# Patient Record
Sex: FEMALE | Race: WHITE | Employment: OTHER | URBAN - NONMETROPOLITAN AREA
[De-identification: names, ages, dates, MRNs, and addresses within clinical notes are randomized per-mention and may not be internally consistent; named-entity substitution may affect disease eponyms.]

---

## 2019-10-16 ENCOUNTER — OFFICE VISIT (OUTPATIENT)
Dept: NEPHROLOGY | Age: 64
End: 2019-10-16
Payer: MEDICAID

## 2019-10-16 ENCOUNTER — NURSE ONLY (OUTPATIENT)
Dept: LAB | Age: 64
End: 2019-10-16

## 2019-10-16 VITALS
DIASTOLIC BLOOD PRESSURE: 89 MMHG | OXYGEN SATURATION: 100 % | WEIGHT: 147.8 LBS | HEART RATE: 75 BPM | HEIGHT: 67 IN | SYSTOLIC BLOOD PRESSURE: 137 MMHG | BODY MASS INDEX: 23.2 KG/M2

## 2019-10-16 DIAGNOSIS — I10 ESSENTIAL HYPERTENSION: ICD-10-CM

## 2019-10-16 DIAGNOSIS — N02.8 IGA NEPHROPATHY, ACUTE: Primary | ICD-10-CM

## 2019-10-16 DIAGNOSIS — N18.4 CKD (CHRONIC KIDNEY DISEASE) STAGE 4, GFR 15-29 ML/MIN (HCC): ICD-10-CM

## 2019-10-16 DIAGNOSIS — N02.8 IGA NEPHROPATHY, ACUTE: ICD-10-CM

## 2019-10-16 DIAGNOSIS — N03.9 CHRONIC GLOMERULONEPHRITIS: ICD-10-CM

## 2019-10-16 LAB
ALBUMIN SERPL-MCNC: 4.1 G/DL (ref 3.5–5.1)
ALP BLD-CCNC: 67 U/L (ref 38–126)
ALT SERPL-CCNC: 7 U/L (ref 11–66)
AMORPHOUS: ABNORMAL
ANION GAP SERPL CALCULATED.3IONS-SCNC: 15 MEQ/L (ref 8–16)
AST SERPL-CCNC: 10 U/L (ref 5–40)
BACTERIA: ABNORMAL
BILIRUB SERPL-MCNC: < 0.2 MG/DL (ref 0.3–1.2)
BILIRUBIN DIRECT: < 0.2 MG/DL (ref 0–0.3)
BILIRUBIN URINE: NEGATIVE
BLOOD, URINE: ABNORMAL
BUN BLDV-MCNC: 32 MG/DL (ref 7–22)
CALCIUM SERPL-MCNC: 9.4 MG/DL (ref 8.5–10.5)
CASTS: ABNORMAL /LPF
CHARACTER, URINE: ABNORMAL
CHLORIDE BLD-SCNC: 109 MEQ/L (ref 98–111)
CO2: 20 MEQ/L (ref 23–33)
COLOR: YELLOW
CREAT SERPL-MCNC: 2.3 MG/DL (ref 0.4–1.2)
CREATININE URINE: 46.4 MG/DL
CRYSTALS: ABNORMAL
EPITHELIAL CELLS, UA: ABNORMAL /HPF
ERYTHROCYTE [DISTWIDTH] IN BLOOD BY AUTOMATED COUNT: 12.6 % (ref 11.5–14.5)
ERYTHROCYTE [DISTWIDTH] IN BLOOD BY AUTOMATED COUNT: 50.2 FL (ref 35–45)
FERRITIN: 343 NG/ML (ref 10–291)
GFR SERPL CREATININE-BSD FRML MDRD: 21 ML/MIN/1.73M2
GLUCOSE BLD-MCNC: 97 MG/DL (ref 70–108)
GLUCOSE, URINE: NEGATIVE MG/DL
HCT VFR BLD CALC: 35.9 % (ref 37–47)
HEMOGLOBIN: 11.8 GM/DL (ref 12–16)
IRON SATURATION: 30 % (ref 20–50)
IRON: 73 UG/DL (ref 50–170)
KETONES, URINE: NEGATIVE
LEUKOCYTE ESTERASE, URINE: ABNORMAL
MCH RBC QN AUTO: 35.8 PG (ref 26–33)
MCHC RBC AUTO-ENTMCNC: 32.9 GM/DL (ref 32.2–35.5)
MCV RBC AUTO: 108.8 FL (ref 81–99)
NITRITE, URINE: POSITIVE
PH UA: 8.5 (ref 5–9)
PLATELET # BLD: 184 THOU/MM3 (ref 130–400)
PMV BLD AUTO: 9 FL (ref 9.4–12.4)
POTASSIUM SERPL-SCNC: 4.3 MEQ/L (ref 3.5–5.2)
PROT/CREAT RATIO, UR: 3.49
PROTEIN UA: 100 MG/DL
PROTEIN, URINE: 161.8 MG/DL
RBC # BLD: 3.3 MILL/MM3 (ref 4.2–5.4)
RBC URINE: ABNORMAL /HPF
SODIUM BLD-SCNC: 144 MEQ/L (ref 135–145)
SPECIFIC GRAVITY UA: 1.01 (ref 1–1.03)
TOTAL IRON BINDING CAPACITY: 240 UG/DL (ref 171–450)
TOTAL PROTEIN: 6.7 G/DL (ref 6.1–8)
UROBILINOGEN, URINE: 0.2 EU/DL (ref 0–1)
WBC # BLD: 5.9 THOU/MM3 (ref 4.8–10.8)
WBC UA: ABNORMAL /HPF

## 2019-10-16 PROCEDURE — G8427 DOCREV CUR MEDS BY ELIG CLIN: HCPCS | Performed by: INTERNAL MEDICINE

## 2019-10-16 PROCEDURE — G8484 FLU IMMUNIZE NO ADMIN: HCPCS | Performed by: INTERNAL MEDICINE

## 2019-10-16 PROCEDURE — 4004F PT TOBACCO SCREEN RCVD TLK: CPT | Performed by: INTERNAL MEDICINE

## 2019-10-16 PROCEDURE — 3017F COLORECTAL CA SCREEN DOC REV: CPT | Performed by: INTERNAL MEDICINE

## 2019-10-16 PROCEDURE — 99203 OFFICE O/P NEW LOW 30 MIN: CPT | Performed by: INTERNAL MEDICINE

## 2019-10-16 PROCEDURE — G8420 CALC BMI NORM PARAMETERS: HCPCS | Performed by: INTERNAL MEDICINE

## 2019-10-16 RX ORDER — AMLODIPINE BESYLATE 2.5 MG/1
2.5 TABLET ORAL DAILY
Refills: 3 | COMMUNITY
Start: 2019-10-07 | End: 2019-10-30 | Stop reason: ALTCHOICE

## 2019-10-16 RX ORDER — PREDNISONE 1 MG/1
5 TABLET ORAL DAILY
COMMUNITY
Start: 2019-08-06 | End: 2019-10-30 | Stop reason: SDUPTHER

## 2019-10-16 RX ORDER — B-COMPLEX WITH VITAMIN C
1 TABLET ORAL DAILY
Qty: 30 TABLET | Refills: 3 | Status: SHIPPED | OUTPATIENT
Start: 2019-10-16 | End: 2020-02-25

## 2019-10-16 RX ORDER — PANTOPRAZOLE SODIUM 40 MG/1
40 TABLET, DELAYED RELEASE ORAL DAILY
COMMUNITY
End: 2020-02-25

## 2019-10-16 RX ORDER — FERROUS SULFATE 325(65) MG
325 TABLET ORAL 2 TIMES DAILY
COMMUNITY
End: 2021-09-14

## 2019-10-16 RX ORDER — CARVEDILOL 12.5 MG/1
12.5 TABLET ORAL DAILY
Refills: 6 | COMMUNITY
Start: 2019-10-07 | End: 2021-09-14

## 2019-10-17 ENCOUNTER — TELEPHONE (OUTPATIENT)
Dept: NEPHROLOGY | Age: 64
End: 2019-10-17

## 2019-10-17 PROBLEM — N03.9 CHRONIC GLOMERULONEPHRITIS: Status: ACTIVE | Noted: 2019-10-17

## 2019-10-17 PROBLEM — N02.B9 IGA NEPHROPATHY, ACUTE: Status: ACTIVE | Noted: 2019-10-17

## 2019-10-17 PROBLEM — N02.8 IGA NEPHROPATHY, ACUTE: Status: ACTIVE | Noted: 2019-10-17

## 2019-10-17 PROBLEM — N18.4 CKD (CHRONIC KIDNEY DISEASE) STAGE 4, GFR 15-29 ML/MIN (HCC): Status: ACTIVE | Noted: 2019-10-17

## 2019-10-17 PROBLEM — I10 ESSENTIAL HYPERTENSION: Status: ACTIVE | Noted: 2019-10-17

## 2019-10-28 DIAGNOSIS — N18.4 CKD (CHRONIC KIDNEY DISEASE) STAGE 4, GFR 15-29 ML/MIN (HCC): Primary | ICD-10-CM

## 2019-10-30 ENCOUNTER — OFFICE VISIT (OUTPATIENT)
Dept: NEPHROLOGY | Age: 64
End: 2019-10-30
Payer: MEDICAID

## 2019-10-30 VITALS
OXYGEN SATURATION: 99 % | WEIGHT: 151 LBS | DIASTOLIC BLOOD PRESSURE: 78 MMHG | HEART RATE: 70 BPM | SYSTOLIC BLOOD PRESSURE: 134 MMHG | BODY MASS INDEX: 23.65 KG/M2

## 2019-10-30 DIAGNOSIS — N02.8 IGA NEPHROPATHY, ACUTE: ICD-10-CM

## 2019-10-30 DIAGNOSIS — N18.4 CKD (CHRONIC KIDNEY DISEASE) STAGE 4, GFR 15-29 ML/MIN (HCC): Primary | ICD-10-CM

## 2019-10-30 DIAGNOSIS — R80.1 PERSISTENT PROTEINURIA: ICD-10-CM

## 2019-10-30 DIAGNOSIS — I10 ESSENTIAL HYPERTENSION: ICD-10-CM

## 2019-10-30 PROCEDURE — 3017F COLORECTAL CA SCREEN DOC REV: CPT | Performed by: INTERNAL MEDICINE

## 2019-10-30 PROCEDURE — G8420 CALC BMI NORM PARAMETERS: HCPCS | Performed by: INTERNAL MEDICINE

## 2019-10-30 PROCEDURE — 99213 OFFICE O/P EST LOW 20 MIN: CPT | Performed by: INTERNAL MEDICINE

## 2019-10-30 PROCEDURE — G8484 FLU IMMUNIZE NO ADMIN: HCPCS | Performed by: INTERNAL MEDICINE

## 2019-10-30 PROCEDURE — G8427 DOCREV CUR MEDS BY ELIG CLIN: HCPCS | Performed by: INTERNAL MEDICINE

## 2019-10-30 PROCEDURE — 4004F PT TOBACCO SCREEN RCVD TLK: CPT | Performed by: INTERNAL MEDICINE

## 2019-10-30 RX ORDER — PREDNISONE 1 MG/1
5 TABLET ORAL DAILY
Qty: 90 TABLET | Refills: 3 | Status: SHIPPED | OUTPATIENT
Start: 2019-10-30 | End: 2020-09-30 | Stop reason: SDUPTHER

## 2019-10-30 RX ORDER — LOSARTAN POTASSIUM 50 MG/1
50 TABLET ORAL DAILY
Qty: 30 TABLET | Refills: 5 | Status: SHIPPED | OUTPATIENT
Start: 2019-10-30 | End: 2020-01-29 | Stop reason: SDUPTHER

## 2019-11-14 LAB
ANION GAP SERPL CALCULATED.3IONS-SCNC: 13 MMOL/L (ref 4–12)
BUN BLDV-MCNC: 35 MG/DL (ref 5–27)
CALCIUM SERPL-MCNC: 8.6 MG/DL (ref 8.5–10.5)
CHLORIDE BLD-SCNC: 108 MMOL/L (ref 98–109)
CO2: 23 MMOL/L (ref 22–32)
CREAT SERPL-MCNC: 2.18 MG/DL (ref 0.4–1)
EGFR AFRICAN AMERICAN: 28 ML/MIN/1.73SQ.M
EGFR IF NONAFRICAN AMERICAN: 23 ML/MIN/1.73SQ.M
GLUCOSE: 75 MG/DL (ref 65–99)
POTASSIUM SERPL-SCNC: 4.1 MMOL/L (ref 3.5–5)
SODIUM BLD-SCNC: 144 MMOL/L (ref 134–146)

## 2019-11-26 ENCOUNTER — OFFICE VISIT (OUTPATIENT)
Dept: UROLOGY | Age: 64
End: 2019-11-26
Payer: COMMERCIAL

## 2019-11-26 VITALS
SYSTOLIC BLOOD PRESSURE: 134 MMHG | WEIGHT: 142 LBS | DIASTOLIC BLOOD PRESSURE: 82 MMHG | BODY MASS INDEX: 21.52 KG/M2 | HEIGHT: 68 IN

## 2019-11-26 DIAGNOSIS — C67.9 MALIGNANT NEOPLASM OF URINARY BLADDER, UNSPECIFIED SITE (HCC): Primary | ICD-10-CM

## 2019-11-26 PROCEDURE — G8427 DOCREV CUR MEDS BY ELIG CLIN: HCPCS | Performed by: UROLOGY

## 2019-11-26 PROCEDURE — 99204 OFFICE O/P NEW MOD 45 MIN: CPT | Performed by: UROLOGY

## 2019-11-26 PROCEDURE — 3017F COLORECTAL CA SCREEN DOC REV: CPT | Performed by: UROLOGY

## 2019-11-26 PROCEDURE — G8484 FLU IMMUNIZE NO ADMIN: HCPCS | Performed by: UROLOGY

## 2019-11-26 PROCEDURE — G8420 CALC BMI NORM PARAMETERS: HCPCS | Performed by: UROLOGY

## 2019-11-26 PROCEDURE — 1036F TOBACCO NON-USER: CPT | Performed by: UROLOGY

## 2019-12-10 ENCOUNTER — TELEPHONE (OUTPATIENT)
Dept: UROLOGY | Age: 64
End: 2019-12-10

## 2020-01-27 LAB
BUN BLDV-MCNC: 35 MG/DL
CALCIUM SERPL-MCNC: 8.5 MG/DL
CHLORIDE BLD-SCNC: 106 MMOL/L
CO2: 26 MMOL/L
CREAT SERPL-MCNC: 2 MG/DL
GFR CALCULATED: 26
GLUCOSE BLD-MCNC: 80 MG/DL
POTASSIUM SERPL-SCNC: 4.1 MMOL/L
SODIUM BLD-SCNC: 140 MMOL/L

## 2020-01-29 ENCOUNTER — OFFICE VISIT (OUTPATIENT)
Dept: NEPHROLOGY | Age: 65
End: 2020-01-29
Payer: COMMERCIAL

## 2020-01-29 VITALS
OXYGEN SATURATION: 100 % | SYSTOLIC BLOOD PRESSURE: 133 MMHG | BODY MASS INDEX: 24.38 KG/M2 | DIASTOLIC BLOOD PRESSURE: 88 MMHG | HEART RATE: 96 BPM | WEIGHT: 158 LBS

## 2020-01-29 PROCEDURE — 3017F COLORECTAL CA SCREEN DOC REV: CPT | Performed by: INTERNAL MEDICINE

## 2020-01-29 PROCEDURE — G8427 DOCREV CUR MEDS BY ELIG CLIN: HCPCS | Performed by: INTERNAL MEDICINE

## 2020-01-29 PROCEDURE — 1036F TOBACCO NON-USER: CPT | Performed by: INTERNAL MEDICINE

## 2020-01-29 PROCEDURE — 99213 OFFICE O/P EST LOW 20 MIN: CPT | Performed by: INTERNAL MEDICINE

## 2020-01-29 PROCEDURE — G8420 CALC BMI NORM PARAMETERS: HCPCS | Performed by: INTERNAL MEDICINE

## 2020-01-29 PROCEDURE — G8484 FLU IMMUNIZE NO ADMIN: HCPCS | Performed by: INTERNAL MEDICINE

## 2020-01-29 RX ORDER — LOSARTAN POTASSIUM 50 MG/1
50 TABLET ORAL DAILY
Qty: 30 TABLET | Refills: 5 | Status: SHIPPED | OUTPATIENT
Start: 2020-01-29 | End: 2020-12-07

## 2020-01-29 NOTE — PROGRESS NOTES
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 The patient was last seen in clinic 3 months ago. Doing well since then. No issues. Saw   Dr. Win Dave from urology. Had repeat CT scan yesterday, results not available yet. Tolerating Losartan without side effects. Does not check Bps at home. Overall controlled here, has not taken her meds yet. Also on Prednisone 5 mg daily. She has a complex medical history. She was previously seeing nephrology (Dr. Kelin aSntos) in [facility]. Bert Luu but due to insurance reasons switched doctors. She was hospitalized in October 2018 with MSSA bacteremia and L4-L5 discitis. She developed LOUISA during that admission with a peak creatinine of 2.8 that improved to 1.4 on discharge. It was felt that this could have been related to vancomycin so she was switched to daptomycin for further treatment. She again was hospitalized a month later in November 2018 readmitted with worsening kidney function and significant proteinuria 17 to 18 g. She also had persistent blood and microscopic hematuria. Her serologies that were sent were all negative. 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 She was treated with high-dose steroids followed by p.o. prednisone and she was also treated with Cytoxan 500 mg IV for 4 doses, her last dose was in April 2019.    She remains on Prednisone 5 mg daily.         Additional history includes Proteinuria - improved with ARB, Upc 1.6 grams from 3.5 grams  3. HTN - goal < 130/80, controlled  4. Bladder CA s/p radical cystectomy Aug 2018. Following with urology. Bloodwork and medications were reviewed and plan of care discussed with the patient. Repeat BMP 3 months, f/u in June or sooner if the need arises.        Zac Burrows,   [facility]

## 2020-02-19 ENCOUNTER — HOSPITAL ENCOUNTER (OUTPATIENT)
Dept: CT IMAGING | Age: 65
Discharge: HOME | End: 2020-02-19
Payer: COMMERCIAL

## 2020-02-19 PROCEDURE — 3209999900 CT COMPARISON OF OUTSIDE FILMS

## 2020-02-25 ENCOUNTER — TELEPHONE (OUTPATIENT)
Dept: UROLOGY | Age: 65
End: 2020-02-25

## 2020-02-25 ENCOUNTER — OFFICE VISIT (OUTPATIENT)
Dept: UROLOGY | Age: 65
End: 2020-02-25
Payer: COMMERCIAL

## 2020-02-25 VITALS
BODY MASS INDEX: 25.58 KG/M2 | DIASTOLIC BLOOD PRESSURE: 70 MMHG | HEIGHT: 67 IN | WEIGHT: 163 LBS | SYSTOLIC BLOOD PRESSURE: 118 MMHG

## 2020-02-25 PROCEDURE — 1036F TOBACCO NON-USER: CPT | Performed by: UROLOGY

## 2020-02-25 PROCEDURE — 3017F COLORECTAL CA SCREEN DOC REV: CPT | Performed by: UROLOGY

## 2020-02-25 PROCEDURE — G8427 DOCREV CUR MEDS BY ELIG CLIN: HCPCS | Performed by: UROLOGY

## 2020-02-25 PROCEDURE — G8419 CALC BMI OUT NRM PARAM NOF/U: HCPCS | Performed by: UROLOGY

## 2020-02-25 PROCEDURE — G8484 FLU IMMUNIZE NO ADMIN: HCPCS | Performed by: UROLOGY

## 2020-02-25 PROCEDURE — 99213 OFFICE O/P EST LOW 20 MIN: CPT | Performed by: UROLOGY

## 2020-02-27 NOTE — PROGRESS NOTES
in this encounter.      Orders Placed:  Orders Placed This Encounter   Procedures    CT ABDOMEN PELVIS WO CONTRAST Additional Contrast? None     Standing Status:   Future     Standing Expiration Date:   2/25/2021     Order Specific Question:   Additional Contrast?     Answer:   None            DEEPA Scruggs MD

## 2020-08-24 ENCOUNTER — HOSPITAL ENCOUNTER (OUTPATIENT)
Dept: CT IMAGING | Age: 65
Discharge: HOME | End: 2020-08-24

## 2020-08-24 ENCOUNTER — TELEPHONE (OUTPATIENT)
Dept: UROLOGY | Age: 65
End: 2020-08-24

## 2020-08-24 NOTE — TELEPHONE ENCOUNTER
Luis F Covarrubias RadiologyMassiel pushing CT Abd & Pelvis. Called [facility], Wilmer Choudhary and advised.

## 2020-08-25 ENCOUNTER — OFFICE VISIT (OUTPATIENT)
Dept: UROLOGY | Age: 65
End: 2020-08-25
Payer: COMMERCIAL

## 2020-08-25 VITALS — BODY MASS INDEX: 25.01 KG/M2 | TEMPERATURE: 97.3 F | WEIGHT: 165 LBS | HEIGHT: 68 IN

## 2020-08-25 PROCEDURE — G8419 CALC BMI OUT NRM PARAM NOF/U: HCPCS | Performed by: UROLOGY

## 2020-08-25 PROCEDURE — 3017F COLORECTAL CA SCREEN DOC REV: CPT | Performed by: UROLOGY

## 2020-08-25 PROCEDURE — 1036F TOBACCO NON-USER: CPT | Performed by: UROLOGY

## 2020-08-25 PROCEDURE — G8427 DOCREV CUR MEDS BY ELIG CLIN: HCPCS | Performed by: UROLOGY

## 2020-08-25 PROCEDURE — 99214 OFFICE O/P EST MOD 30 MIN: CPT | Performed by: UROLOGY

## 2020-08-25 NOTE — PROGRESS NOTES
[address] [place] [id number]  Dept: [phone]  Dept Fax: [phone] : [address] [facility] Urology Office Note -     Patient:  Trista Dinh  YOB: 1955  Date: 8/25/2020    The patient is a 60 y.o. female who presents today for evaluation of the following problems: bladder cancer  Chief Complaint   Patient presents with    Results     CT Results done at [facility]     referred/consultation requested by Sisi House MD.    HISTORY OF PRESENT ILLNESS:     Bladder Cancer  Onset was  Months ago  Overall, the problem(s) are unchanged. Severity is described as moderate to severe. Associated Symptoms: No dysuria, no gross hematuria. Current Pain Severity: 0    imaging reviewed today  No issues with conduit  Doing very well  No UTIs      Summary of Previous Records:  Hx of cystectomy with ileal conduit months ago  Here to establish care  No recent UTI  No pain  No bone pain, no weight loss  No staging imaging since surgery. Requested/reviewed records from Sisi House MD office and/or outside physician/EMR    (Patient's old records have been requested, reviewed and pertinent findings summarized in today's note.)    Procedures Today: N/A    Last several PSA's:  No results found for: PSA    Last total testosterone:  No results found for: TESTOSTERONE    Urinalysis today:  No results found for this visit on 08/25/20.     Last BUN and creatinine:  Lab Results   Component Value Date    BUN 35 01/27/2020     Lab Results   Component Value Date    CREATININE 2.0 01/27/2020       Imaging Reviewed during this Office Visit:   Blas Everett MD independently reviewed the images and verified the radiology reports from:              PAST MEDICAL, FAMILY AND SOCIAL HISTORY:  Past Medical History:   Diagnosis Date    Bladder [facility] ([facility] [address]     Hypertension     IgA nephropathy     Rapidly progressive glomerulonephritis      Past Surgical History:   Procedure Laterality Date    BLADDER REMOVAL      HYSTERECTOMY, TOTAL ABDOMINAL       No family history on file. Outpatient Medications Marked as Taking for the 20 encounter (Office Visit) with Ita Perrin MD   Medication Sig Dispense Refill    losartan (COZAAR) 50 MG tablet Take 1 tablet by mouth daily 30 tablet 5    predniSONE (DELTASONE) 5 MG tablet Take 1 tablet by mouth daily 90 tablet 3    carvedilol (COREG) 12.5 MG tablet Take 12.5 mg by mouth daily  6    ferrous sulfate 325 (65 Fe) MG tablet Take 325 mg by mouth 2 times daily         Patient has no known allergies. Social History     Tobacco Use   Smoking Status Former Smoker    Packs/day: 1.00    Years: 30.00    Pack years: 30.00    Last attempt to quit: 2014    Years since quittin.7   Smokeless Tobacco Never Used      (If patient a smoker, smoking cessation counseling offered)   Social History     Substance and Sexual Activity   Alcohol Use Yes       REVIEW OF SYSTEMS:  Constitutional: negative  Eyes: negative  Respiratory: negative  Cardiovascular: negative  Gastrointestinal: negative  Genitourinary: see HPI  Musculoskeletal: negative  Skin: negative   Neurological: negative  Hematological/Lymphatic: negative  Psychological: negative        Physical Exam:    This a 60 y.o. female  Vitals:    20 0918   Temp: 97.3 °F (36.3 °C)     Body mass index is 25.46 kg/m². Constitutional: Patient in no acute distress;         Assessment and Plan        1. Malignant neoplasm of urinary bladder, unspecified site ([facility] [address]    2. Atrophy of right kidney               Plan:       Atrophic right kidney- will monitor BMP. Repeat in one year. Bladder cancer- CT abdomen/pelvis without contrast and a Chest xray. Return in 1 year with imaging and bmp          Prescriptions Ordered:  No orders of the defined types were placed in this encounter.      Orders Placed:  Orders Placed This Encounter   Procedures    CT ABDOMEN PELVIS WO CONTRAST Additional Contrast? None     Standing Status:   Future     Standing Expiration Date:   8/25/2021     Order Specific Question:   Additional Contrast?     Answer:   None    XR CHEST (2 VW)     Standing Status:   Future     Standing Expiration Date:   8/25/2021    Basic Metabolic Panel     Standing Status:   Future     Standing Expiration Date:   8/25/2021            Brijesh Hamilton MD

## 2020-09-24 LAB
BASOPHILS ABSOLUTE: NORMAL
BASOPHILS RELATIVE PERCENT: NORMAL
EOSINOPHILS ABSOLUTE: NORMAL
EOSINOPHILS RELATIVE PERCENT: NORMAL
HCT VFR BLD CALC: 36.7 % (ref 36–46)
HEMOGLOBIN: 12.5 G/DL (ref 12–16)
LYMPHOCYTES ABSOLUTE: NORMAL
LYMPHOCYTES RELATIVE PERCENT: NORMAL
MCH RBC QN AUTO: NORMAL PG
MCHC RBC AUTO-ENTMCNC: NORMAL G/DL
MCV RBC AUTO: NORMAL FL
MONOCYTES ABSOLUTE: NORMAL
MONOCYTES RELATIVE PERCENT: NORMAL
NEUTROPHILS ABSOLUTE: NORMAL
NEUTROPHILS RELATIVE PERCENT: NORMAL
PLATELET # BLD: 194 K/ΜL
PMV BLD AUTO: NORMAL FL
PTH INTACT: 70.9
RBC # BLD: 3.58 10^6/ΜL
VITAMIN D 25-HYDROXY: 37
VITAMIN D2, 25 HYDROXY: NORMAL
VITAMIN D3,25 HYDROXY: NORMAL
WBC # BLD: 5.5 10^3/ML

## 2020-09-25 ENCOUNTER — TELEPHONE (OUTPATIENT)
Dept: NEPHROLOGY | Age: 65
End: 2020-09-25

## 2020-09-25 NOTE — TELEPHONE ENCOUNTER
----- Message from Erasto Mckay DO sent at 9/25/2020  2:42 PM EDT -----  Why is BMP not back?   Please make sure it was drawn  ----- Message -----  From: Linda Cowan MA  Sent: 9/25/2020   8:42 AM EDT  To: Erasto Mckay DO

## 2020-09-28 ENCOUNTER — TELEPHONE (OUTPATIENT)
Dept: NEPHROLOGY | Age: 65
End: 2020-09-28

## 2020-09-28 LAB
BUN BLDV-MCNC: 31 MG/DL
CALCIUM SERPL-MCNC: 9.2 MG/DL
CHLORIDE BLD-SCNC: 107 MMOL/L
CO2: 23 MMOL/L
CREAT SERPL-MCNC: 2.1 MG/DL
GFR CALCULATED: 24
GLUCOSE BLD-MCNC: 99 MG/DL
POTASSIUM SERPL-SCNC: 4.4 MMOL/L
SODIUM BLD-SCNC: 139 MMOL/L

## 2020-09-28 NOTE — TELEPHONE ENCOUNTER
Received a call from the lab. They stated that patient was there to have lab drawn, but they had no lab. I show all of the ordered tests in her chart from 9/24/20. I informed patient, and patient verbalized understanding, does not need any further labs at this time.

## 2020-09-30 ENCOUNTER — OFFICE VISIT (OUTPATIENT)
Dept: NEPHROLOGY | Age: 65
End: 2020-09-30
Payer: COMMERCIAL

## 2020-09-30 VITALS
OXYGEN SATURATION: 100 % | DIASTOLIC BLOOD PRESSURE: 90 MMHG | HEART RATE: 76 BPM | TEMPERATURE: 97.7 F | WEIGHT: 169 LBS | BODY MASS INDEX: 26.08 KG/M2 | SYSTOLIC BLOOD PRESSURE: 127 MMHG

## 2020-09-30 PROCEDURE — 3017F COLORECTAL CA SCREEN DOC REV: CPT | Performed by: INTERNAL MEDICINE

## 2020-09-30 PROCEDURE — 1036F TOBACCO NON-USER: CPT | Performed by: INTERNAL MEDICINE

## 2020-09-30 PROCEDURE — G8419 CALC BMI OUT NRM PARAM NOF/U: HCPCS | Performed by: INTERNAL MEDICINE

## 2020-09-30 PROCEDURE — G8427 DOCREV CUR MEDS BY ELIG CLIN: HCPCS | Performed by: INTERNAL MEDICINE

## 2020-09-30 PROCEDURE — 99214 OFFICE O/P EST MOD 30 MIN: CPT | Performed by: INTERNAL MEDICINE

## 2020-09-30 RX ORDER — PREDNISONE 1 MG/1
5 TABLET ORAL EVERY OTHER DAY
Qty: 90 TABLET | Refills: 3
Start: 2020-09-30 | End: 2021-01-05 | Stop reason: SDUPTHER

## 2020-09-30 NOTE — PROGRESS NOTES
cancer ([facility] [address]     Hypertension     IgA nephropathy     Rapidly progressive glomerulonephritis      Past Surgical History:   Procedure Laterality Date    BLADDER REMOVAL      HYSTERECTOMY, TOTAL ABDOMINAL          VITALS:  BP (!) 127/90 (Site: Right Upper Arm, Position: Sitting, Cuff Size: Large Adult)   Pulse 76   Temp 97.7 °F (36.5 °C) (Temporal)   Wt 169 lb (76.7 kg)   SpO2 100%   BMI 26.08 kg/m²   Wt Readings from Last 3 Encounters:   09/30/20 169 lb (76.7 kg)   08/25/20 165 lb (74.8 kg)   02/25/20 163 lb (73.9 kg)     Body mass index is 26.08 kg/m². General Appearance: alert and cooperative with exam, appears comfortable, no distress  HEENT: EOMI, moist oral mucus membranes  Neck: No jugular venous distention  Lungs: Air entry B/L, no crackles or rales, no use of accessory muscles  Heart: S1, S2 heard, no rub  GI: soft, non-tender, +urostomy  Extremities: No sig LE edema, no cyanosis  Skin: warm, dry  Neurologic: no tremor, no asterixis, no focal neurologic deficits     Medications:  Current Outpatient Medications   Medication Sig Dispense Refill    Calcium Carb-Cholecalciferol (OYSCO D) 250-125 MG-UNIT TABS Take 1 tablet by mouth daily 30 tablet 11    predniSONE (DELTASONE) 5 MG tablet Take 1 tablet by mouth every other day 90 tablet 3    losartan (COZAAR) 50 MG tablet Take 1 tablet by mouth daily 30 tablet 5    carvedilol (COREG) 12.5 MG tablet Take 12.5 mg by mouth daily  6    ferrous sulfate 325 (65 Fe) MG tablet Take 325 mg by mouth 2 times daily       No current facility-administered medications for this visit.          Laboratory & Diagnostics:  CBC:   Lab Results   Component Value Date    WBC 5.5 09/24/2020    HGB 12.5 09/24/2020    HCT 36.7 09/24/2020    .8 (H) 10/16/2019     09/24/2020     BMP:    Lab Results   Component Value Date     09/28/2020     01/27/2020     11/13/2019    K 4.4 09/28/2020    K 4.1 01/27/2020    K 4.1 11/13/2019     09/28/2020     01/27/2020     11/13/2019    CO2 23 09/28/2020    CO2 26 01/27/2020    CO2 23 11/13/2019    BUN 31 09/28/2020    BUN 35 01/27/2020    BUN 35 (H) 11/13/2019    CREATININE 2.1 09/28/2020    CREATININE 2.0 01/27/2020    CREATININE 2.18 (H) 11/13/2019    GLUCOSE 99 09/28/2020    GLUCOSE 80 01/27/2020    GLUCOSE 75 11/13/2019      Hepatic:   Lab Results   Component Value Date    AST 10 10/16/2019    ALT 7 (L) 10/16/2019    BILITOT <0.2 (L) 10/16/2019    ALKPHOS 67 10/16/2019     BNP: No results found for: BNP  Lipids: No results found for: CHOL, HDL  INR: No results found for: INR  URINE:   Lab Results   Component Value Date    PROTUR 161.8 10/16/2019     Lab Results   Component Value Date    NITRU POSITIVE 10/16/2019    COLORU YELLOW 10/16/2019    PHUR 8.5 10/16/2019    LABCAST NONE SEEN 10/16/2019    WBCUA 15-25 10/16/2019    RBCUA 5-10 10/16/2019    BACTERIA MODERATE 10/16/2019    SPECGRAV 1.010 10/16/2019    LEUKOCYTESUR SMALL 10/16/2019    UROBILINOGEN 0.2 10/16/2019    BILIRUBINUR NEGATIVE 10/16/2019    BLOODU MODERATE 10/16/2019    KETUA NEGATIVE 10/16/2019    AMORPHOUS DEBRIS 10/16/2019      Microalbumen/Creatinine ratio:  No components found for: RUCREAT        Impression/Plan:   1. 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 Renal fxn is stable and proteinuria is improving, Upc is 0.6. Will decrease Prednisone to 5 mg every other day. Repeat a BMP and urine in1 month, pt to call if noticing any edema or frothy urine. Advised to take Oscal daily with the Prednisone for bone protection. Goals of care include slowing rate of progression by controlling blood pressure, blood glucoses and albuminuria and by avoiding nephrotoxins such as NSAIDs and IV contrast.  2. Proteinuria - improving with ARB, Upc down to 0.6 grams, continue Losartan  3. HTN - overall controlled  4.

## 2020-10-30 ENCOUNTER — TELEPHONE (OUTPATIENT)
Dept: NEPHROLOGY | Age: 65
End: 2020-10-30

## 2020-10-30 LAB
BILIRUBIN, URINE: NEGATIVE
BLOOD, URINE: POSITIVE
CLARITY: CLEAR
COLOR: YELLOW
GLUCOSE URINE: NEGATIVE
KETONES, URINE: NEGATIVE
LEUKOCYTE ESTERASE, URINE: POSITIVE
NITRITE, URINE: POSITIVE
PH UA: 8 (ref 4.5–8)
PROTEIN UA: POSITIVE
SPECIFIC GRAVITY, URINE: 1.01
UROBILINOGEN, URINE: NORMAL

## 2020-10-30 NOTE — TELEPHONE ENCOUNTER
Pt states she is not having any burning w/urination. I spoke w/Dr. Krystal Ramirez and as long as pt is not having any symptoms we are not going to treat it.

## 2020-11-04 NOTE — TELEPHONE ENCOUNTER
The protein in the urine is the same and is minimal.  I am not seeing the creatinine she is referring to that was increased? The last one was from sept 28th that I see and it was stable, am I missing it?

## 2020-11-04 NOTE — TELEPHONE ENCOUNTER
Regis Roche let her know everything is looking stable and it is ok to continue with prednisone every other day.

## 2020-11-04 NOTE — TELEPHONE ENCOUNTER
Patient called asking if the steroid is causing the protein in the urin and the increase in creat. She is wondering if she should go back on the steroid everyday? Also patient states she would not know if she had UTI symptoms due to having the urostomy and she can not feel when she goes.        Please Advise

## 2020-12-07 RX ORDER — LOSARTAN POTASSIUM 50 MG/1
TABLET ORAL
Qty: 90 TABLET | Refills: 1 | Status: SHIPPED | OUTPATIENT
Start: 2020-12-07 | End: 2021-09-12

## 2021-01-05 RX ORDER — PREDNISONE 1 MG/1
5 TABLET ORAL EVERY OTHER DAY
Qty: 90 TABLET | Refills: 3 | Status: SHIPPED | OUTPATIENT
Start: 2021-01-05 | End: 2021-06-07 | Stop reason: ALTCHOICE

## 2021-02-04 LAB
BUN BLDV-MCNC: 30 MG/DL
CALCIUM SERPL-MCNC: 9.3 MG/DL
CHLORIDE BLD-SCNC: 105 MMOL/L
CO2: 25 MMOL/L
CREAT SERPL-MCNC: 1.7 MG/DL
GFR CALCULATED: 31
GLUCOSE BLD-MCNC: 86 MG/DL
MICROSCOPIC URINE: NORMAL
POTASSIUM SERPL-SCNC: 3.7 MMOL/L
SODIUM BLD-SCNC: 140 MMOL/L

## 2021-02-08 ENCOUNTER — OFFICE VISIT (OUTPATIENT)
Dept: NEPHROLOGY | Age: 66
End: 2021-02-08
Payer: MEDICARE

## 2021-02-08 VITALS
DIASTOLIC BLOOD PRESSURE: 64 MMHG | OXYGEN SATURATION: 98 % | HEART RATE: 72 BPM | TEMPERATURE: 96.9 F | BODY MASS INDEX: 26.23 KG/M2 | WEIGHT: 170 LBS | SYSTOLIC BLOOD PRESSURE: 118 MMHG

## 2021-02-08 DIAGNOSIS — N18.32 STAGE 3B CHRONIC KIDNEY DISEASE (HCC): ICD-10-CM

## 2021-02-08 DIAGNOSIS — N02.8 IGA NEPHROPATHY, ACUTE: Primary | ICD-10-CM

## 2021-02-08 DIAGNOSIS — R80.8 OTHER PROTEINURIA: ICD-10-CM

## 2021-02-08 PROCEDURE — G8419 CALC BMI OUT NRM PARAM NOF/U: HCPCS | Performed by: INTERNAL MEDICINE

## 2021-02-08 PROCEDURE — 99213 OFFICE O/P EST LOW 20 MIN: CPT | Performed by: INTERNAL MEDICINE

## 2021-02-08 PROCEDURE — 1036F TOBACCO NON-USER: CPT | Performed by: INTERNAL MEDICINE

## 2021-02-08 PROCEDURE — G8427 DOCREV CUR MEDS BY ELIG CLIN: HCPCS | Performed by: INTERNAL MEDICINE

## 2021-02-08 PROCEDURE — G8484 FLU IMMUNIZE NO ADMIN: HCPCS | Performed by: INTERNAL MEDICINE

## 2021-02-08 PROCEDURE — 3017F COLORECTAL CA SCREEN DOC REV: CPT | Performed by: INTERNAL MEDICINE

## 2021-02-08 RX ORDER — PREDNISONE 2.5 MG
2.5 TABLET ORAL
Qty: 36 TABLET | Refills: 1 | Status: SHIPPED | OUTPATIENT
Start: 2021-02-08 | End: 2021-05-09

## 2021-02-08 NOTE — PROGRESS NOTES
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 She was previously seeing nephrology (Dr. Beatrice Cho) in [facility]. She was hospitalized in October 2018 with MSSA bacteremia and L4-L5 discitis. She developed LOUISA during that admission with a peak creatinine of 2.8 that improved to 1.4 on discharge. It was felt that this could have been related to vancomycin so she was switched to daptomycin for further treatment. She again was hospitalized a month later in November 2018 readmitted with worsening kidney function and significant proteinuria 17 to 18 g. She also had persistent blood and microscopic hematuria. Her serologies that were sent were all negative. 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 She was treated with high-dose steroids followed by p.o. prednisone and she was also treated with Cytoxan 500 mg IV for 4 doses, her last dose was in April 2019. Patient seen. She is doing ok. BP is controlled. No LE edema. On Pred 5 mg QOD (but she stopped taking past 3 weeks)  She started an herbal medication for her kidneys. Drinking lots of water. No gross hematuria through urostomy or dysuria/fevers/pain. Additional history includes bladder CA s/p radical cystectomy August 2018 chemotherapy. she is following with Dr. Paula June, urology. CT scans are showing no evidence of recurrence. Pertinent items are noted in HPI. 09/24/2020    .8 (H) 10/16/2019     09/24/2020     BMP:    Lab Results   Component Value Date     02/04/2021     09/28/2020     01/27/2020    K 3.7 02/04/2021    K 4.4 09/28/2020    K 4.1 01/27/2020     02/04/2021     09/28/2020     01/27/2020    CO2 25 02/04/2021    CO2 23 09/28/2020    CO2 26 01/27/2020    BUN 30 02/04/2021    BUN 31 09/28/2020    BUN 35 01/27/2020    CREATININE 1.7 02/04/2021    CREATININE 2.1 09/28/2020    CREATININE 2.0 01/27/2020    GLUCOSE 86 02/04/2021    GLUCOSE 99 09/28/2020    GLUCOSE 80 01/27/2020      Hepatic:   Lab Results   Component Value Date    AST 10 10/16/2019    ALT 7 (L) 10/16/2019    BILITOT <0.2 (L) 10/16/2019    ALKPHOS 67 10/16/2019     BNP: No results found for: BNP  Lipids: No results found for: CHOL, HDL  INR: No results found for: INR  URINE:   Lab Results   Component Value Date    PROTUR 161.8 10/16/2019     Lab Results   Component Value Date    NITRU Positive 10/30/2020    COLORU Yellow 10/30/2020    PHUR 8.0 10/30/2020    LABCAST NONE SEEN 10/16/2019    WBCUA 15-25 10/16/2019    RBCUA 5-10 10/16/2019    BACTERIA MODERATE 10/16/2019    CLARITYU Clear 10/30/2020    SPECGRAV 1.010 10/16/2019    LEUKOCYTESUR Positive 10/30/2020    UROBILINOGEN Normal 10/30/2020    BILIRUBINUR Negative 10/30/2020    BLOODU Positive 10/30/2020    GLUCOSEU negative 10/30/2020    KETUA Negative 10/30/2020    AMORPHOUS DEBRIS 10/16/2019      Microalbumen/Creatinine ratio:  No components found for: RUCREAT        Impression/Plan:   1. 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 Creat a little better actually. Upc 0.6. Will reduce Pred to 2.5 mg MWF and as long as stable will probably stop next visit. Repeat a bmp, UPc in 1 month.   Goals of care include slowing rate of progression by controlling blood pressure, blood glucoses and albuminuria and by avoiding nephrotoxins such as NSAIDs and IV contrast.  2. Proteinuria - improving with ARB, Upc down to 0.6 grams, continue Losartan  3. HTN - overall controlled  4. Bladder CA s/p radical cystectomy Aug 2018. Following with urology. 5. Chronic bladder colonization: she is asymptomatic from UTI standpoint, will hold off treating with any antibiotics    Bloodwork and medications were reviewed and plan of care discussed with the patient. Return in 4 months or sooner if the need arises.        Ag Fink,   [facility]

## 2021-06-07 ENCOUNTER — OFFICE VISIT (OUTPATIENT)
Dept: NEPHROLOGY | Age: 66
End: 2021-06-07
Payer: MEDICARE

## 2021-06-07 VITALS
WEIGHT: 166 LBS | TEMPERATURE: 97 F | SYSTOLIC BLOOD PRESSURE: 135 MMHG | DIASTOLIC BLOOD PRESSURE: 88 MMHG | HEART RATE: 72 BPM | OXYGEN SATURATION: 100 % | BODY MASS INDEX: 25.62 KG/M2

## 2021-06-07 DIAGNOSIS — N18.4 CKD (CHRONIC KIDNEY DISEASE) STAGE 4, GFR 15-29 ML/MIN (HCC): Primary | ICD-10-CM

## 2021-06-07 PROCEDURE — 3017F COLORECTAL CA SCREEN DOC REV: CPT | Performed by: INTERNAL MEDICINE

## 2021-06-07 PROCEDURE — G8419 CALC BMI OUT NRM PARAM NOF/U: HCPCS | Performed by: INTERNAL MEDICINE

## 2021-06-07 PROCEDURE — 1036F TOBACCO NON-USER: CPT | Performed by: INTERNAL MEDICINE

## 2021-06-07 PROCEDURE — G8427 DOCREV CUR MEDS BY ELIG CLIN: HCPCS | Performed by: INTERNAL MEDICINE

## 2021-06-07 PROCEDURE — 99214 OFFICE O/P EST MOD 30 MIN: CPT | Performed by: INTERNAL MEDICINE

## 2021-06-07 NOTE — PROGRESS NOTES
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 She was hospitalized in October 2018 with MSSA bacteremia and L4-L5 discitis. She developed LOUISA during that admission with a peak creatinine of 2.8 that improved to 1.4 on discharge. She again was hospitalized a month later in November 2018 readmitted with worsening kidney function and significant proteinuria 17 to 18 g. She also had persistent blood and microscopic hematuria. Her serologies that were sent were all negative. 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 She was treated with high-dose steroids followed by p.o. prednisone and she was also treated with Cytoxan 500 mg IV for 4 doses, her last dose was in April 2019. Additional history includes bladder CA s/p radical cystectomy August 2018 chemotherapy. she is following with Dr. Abby Palacio, urology. CT scans are showing no evidence of recurrence. Overall today she reports feeling ok. On Prednisone 2.5 mg QOD but sometimes forgets and only taking it once a week. BP stable. No edema. No gross hematuria noted. Pertinent items are noted in HPI.          Past History:  Past Medical History:   Diagnosis Date    Bladder cancer ([address]     Hypertension     IgA nephropathy     Rapidly progressive glomerulonephritis      Past Surgical History:   Procedure Laterality Date    BLADDER REMOVAL      HYSTERECTOMY, TOTAL ABDOMINAL VITALS:  /88 (Site: Left Upper Arm, Position: Sitting, Cuff Size: Large Adult)   Pulse 72   Temp 97 °F (36.1 °C) (Temporal)   Wt 166 lb (75.3 kg)   SpO2 100%   BMI 25.62 kg/m²   Wt Readings from Last 3 Encounters:   06/07/21 166 lb (75.3 kg)   02/08/21 170 lb (77.1 kg)   09/30/20 169 lb (76.7 kg)     Body mass index is 25.62 kg/m². General Appearance: alert and cooperative with exam, appears comfortable, no distress  HEENT: EOMI, moist oral mucus membranes  Neck: No jugular venous distention  Lungs: Air entry B/L, no crackles or rales, no use of accessory muscles  Heart: S1, S2 heard, no rub  GI: soft, non-tender, +urostomy  Extremities: No sig LE edema, no cyanosis  Skin: warm, dry  Neurologic: no tremor, no asterixis, no focal neurologic deficits     Medications:  Current Outpatient Medications   Medication Sig Dispense Refill    NONFORMULARY Renohelp 649 mg      predniSONE (DELTASONE) 5 MG tablet Take 1 tablet by mouth every other day 90 tablet 3    losartan (COZAAR) 50 MG tablet Take 1 tablet by mouth once daily 90 tablet 1    Calcium Carb-Cholecalciferol (OYSCO D) 250-125 MG-UNIT TABS Take 1 tablet by mouth daily 30 tablet 11    carvedilol (COREG) 12.5 MG tablet Take 12.5 mg by mouth daily  6    ferrous sulfate 325 (65 Fe) MG tablet Take 325 mg by mouth 2 times daily       No current facility-administered medications for this visit.         Laboratory & Diagnostics:  CBC:   Lab Results   Component Value Date    WBC 4.9 06/02/2021    HGB 13.4 06/02/2021    HCT 38.0 06/02/2021    MCV 97.2 06/02/2021     06/02/2021     BMP:    Lab Results   Component Value Date     06/02/2021     02/04/2021     09/28/2020    K 4.3 06/02/2021    K 3.7 02/04/2021    K 4.4 09/28/2020     06/02/2021     02/04/2021     09/28/2020    CO2 25 06/02/2021    CO2 25 02/04/2021    CO2 23 09/28/2020    BUN 38 (H) 06/02/2021    BUN 30 02/04/2021    BUN 31 09/28/2020 CREATININE 1.8 (H) 06/02/2021    CREATININE 26.8 06/02/2021    CREATININE 1.7 02/04/2021    GLUCOSE 92 06/02/2021    GLUCOSE 86 02/04/2021    GLUCOSE 99 09/28/2020      Hepatic:   Lab Results   Component Value Date    AST 10 10/16/2019    ALT 7 (L) 10/16/2019    BILITOT <0.2 (L) 10/16/2019    ALKPHOS 67 10/16/2019     BNP: No results found for: BNP  Lipids: No results found for: CHOL, HDL  INR: No results found for: INR  URINE:   Lab Results   Component Value Date    PROTUR 23 06/02/2021     Lab Results   Component Value Date    NITRU Positive 10/30/2020    COLORU Yellow 10/30/2020    PHUR 8.0 10/30/2020    LABCAST NONE SEEN 10/16/2019    WBCUA 15-25 10/16/2019    RBCUA 5-10 10/16/2019    BACTERIA MODERATE 10/16/2019    CLARITYU Clear 10/30/2020    SPECGRAV 1.010 10/16/2019    LEUKOCYTESUR Positive 10/30/2020    UROBILINOGEN Normal 10/30/2020    BILIRUBINUR Negative 10/30/2020    BLOODU Positive 10/30/2020    GLUCOSEU negative 10/30/2020    KETUA Negative 10/30/2020    AMORPHOUS DEBRIS 10/16/2019      Microalbumen/Creatinine ratio:  No components found for: RUCREAT        Impression/Plan:   1. 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 On low dose Prednisone which we will stop. repeat a bmp and urine in 1 month. Goals of care include slowing rate of progression by controlling blood pressure, blood glucoses and albuminuria and by avoiding nephrotoxins such as NSAIDs and IV contrast.  2. Proteinuria - about 1 gram, continue Losartan  3. HTN - overall controlled  4. Bladder CA s/p radical cystectomy Aug 2018. Following with urology. 5. Chronic bladder colonization    Bloodwork and medications were reviewed and plan of care discussed with the patient. Return in 4 months or sooner if the need arises.        Gauri Gómez,   [facility]

## 2021-07-09 LAB
BUN BLDV-MCNC: 26 MG/DL
CALCIUM SERPL-MCNC: 9.4 MG/DL
CHLORIDE BLD-SCNC: 102 MMOL/L
CO2: 28 MMOL/L
CREAT SERPL-MCNC: 1.8 MG/DL
CREATININE, URINE: 65.5
GFR CALCULATED: 28
GLUCOSE BLD-MCNC: 141 MG/DL
MICROALBUMIN/CREAT 24H UR: 37.61 MG/G{CREAT}
MICROALBUMIN/CREAT UR-RTO: 574
POTASSIUM SERPL-SCNC: 4 MMOL/L
SODIUM BLD-SCNC: 139 MMOL/L

## 2021-07-12 ENCOUNTER — TELEPHONE (OUTPATIENT)
Dept: NEPHROLOGY | Age: 66
End: 2021-07-12

## 2021-07-12 NOTE — TELEPHONE ENCOUNTER
----- Message from Leann Gleason DO sent at 7/12/2021  4:49 PM EDT -----  Labs look stable since stopping the prednisone  ----- Message -----  From: Glenny Arenas MA  Sent: 7/12/2021   8:17 AM EDT  To: Leann Gleason DO

## 2021-07-28 ENCOUNTER — TELEPHONE (OUTPATIENT)
Dept: UROLOGY | Age: 66
End: 2021-07-28

## 2021-07-28 DIAGNOSIS — C67.9 MALIGNANT NEOPLASM OF URINARY BLADDER, UNSPECIFIED SITE (HCC): Primary | ICD-10-CM

## 2021-07-28 NOTE — TELEPHONE ENCOUNTER
Trish Espinal spoke to [facility] to precert CT abd/palvis, then was transferred to [facility] through [facility] spoke to [facility] who took CPT code 63638 and ICD 10 C 67.9 and stated CT abd/pelvis wo contrast was denied, but they would approve a CT abdomen wo contrast. Auth # [id number] approved at [facility] from 7/28/21-8/27/21. Patient has a follow up appointment in the office on 8/24/21. Milly Arguelles scheduled scan and notified patient. CT abd/pelvis wo contrast was cancelled in patient's chart and a new order for CT abd wo contrast was ordered.

## 2021-07-29 ENCOUNTER — TELEPHONE (OUTPATIENT)
Dept: UROLOGY | Age: 66
End: 2021-07-29

## 2021-07-29 NOTE — TELEPHONE ENCOUNTER
Disc from , CT.  Abd & Pelvis 1-27-20. Disc stored in [facility] Rec [facility].

## 2021-07-29 NOTE — TELEPHONE ENCOUNTER
Patient scheduled for ct abdomen wo cont  at [facility] on August 18 at 10 am arrive at 9:45. Npo 4 hrs prior. faxxed order to [phone]. Patient advised of instructions.   Order mailed to patient

## 2021-08-18 NOTE — TELEPHONE ENCOUNTER
Called [facility], No imaging. Appt Desk states having imaging done today. Will Call later on and check. FERN woth pt to call if she did not have it done to r/s appt.

## 2021-09-13 RX ORDER — LOSARTAN POTASSIUM 50 MG/1
TABLET ORAL
Qty: 90 TABLET | Refills: 2 | Status: SHIPPED | OUTPATIENT
Start: 2021-09-13 | End: 2022-09-15

## 2021-09-14 ENCOUNTER — OFFICE VISIT (OUTPATIENT)
Dept: UROLOGY | Age: 66
End: 2021-09-14
Payer: MEDICARE

## 2021-09-14 ENCOUNTER — HOSPITAL ENCOUNTER (OUTPATIENT)
Dept: CT IMAGING | Age: 66
Discharge: HOME | End: 2021-09-14

## 2021-09-14 VITALS — WEIGHT: 167.9 LBS | HEIGHT: 68 IN | BODY MASS INDEX: 25.45 KG/M2 | RESPIRATION RATE: 14 BRPM

## 2021-09-14 DIAGNOSIS — Z00.6 EXAMINATION FOR NORMAL COMPARISON FOR CLINICAL RESEARCH: ICD-10-CM

## 2021-09-14 DIAGNOSIS — C67.9 MALIGNANT NEOPLASM OF URINARY BLADDER, UNSPECIFIED SITE (HCC): Primary | ICD-10-CM

## 2021-09-14 DIAGNOSIS — N26.1 ATROPHY OF RIGHT KIDNEY: ICD-10-CM

## 2021-09-14 PROCEDURE — 3017F COLORECTAL CA SCREEN DOC REV: CPT | Performed by: UROLOGY

## 2021-09-14 PROCEDURE — G8419 CALC BMI OUT NRM PARAM NOF/U: HCPCS | Performed by: UROLOGY

## 2021-09-14 PROCEDURE — G8427 DOCREV CUR MEDS BY ELIG CLIN: HCPCS | Performed by: UROLOGY

## 2021-09-14 PROCEDURE — 99213 OFFICE O/P EST LOW 20 MIN: CPT | Performed by: UROLOGY

## 2021-09-14 PROCEDURE — 1036F TOBACCO NON-USER: CPT | Performed by: UROLOGY

## 2021-09-14 NOTE — PROGRESS NOTES
[address] [address]  Dept: [phone]  Dept Fax: [phone] : [address] [facility] Office Note -     Patient:  Aneta Mitchell  YOB: 1955  Date: 9/14/2021    The patient is a 61 y.o. female who presents today for evaluation of the following problems: bladder cancer  Chief Complaint   Patient presents with    Bladder Cancer    1 Year Follow Up     CT prior    referred/consultation requested by Yaz Retana MD.    HISTORY OF PRESENT ILLNESS:       Bladder cancer  imaging reviewed today no recurrence  No issues with conduit  Doing very well  No UTIs    Atrophic right kidney  No flank pain  Creat 1.8    Summary of Previous Records:  Hx of cystectomy with ileal conduit months ago  Here to establish care  No recent UTI  No pain  No bone pain, no weight loss  No staging imaging since surgery. Requested/reviewed records from Yaz Retana MD office and/or outside physician/EMR    (Patient's old records have been requested, reviewed and pertinent findings summarized in today's note.)    Procedures Today: N/A    Last several PSA's:  No results found for: PSA    Last total testosterone:  No results found for: TESTOSTERONE    Urinalysis today:  No results found for this visit on 09/14/21.     Last BUN and creatinine:  Lab Results   Component Value Date    BUN 26 07/09/2021     Lab Results   Component Value Date    CREATININE 1.8 07/09/2021       Imaging Reviewed during this Office Visit:   Suki Reid MD independently reviewed the images and verified the radiology reports from:            PAST MEDICAL, FAMILY AND SOCIAL HISTORY:  Past Medical History:   Diagnosis Date    Bladder cancer ([facility] [address]     Hypertension     IgA nephropathy     Rapidly progressive glomerulonephritis      Past Surgical History:   Procedure Laterality Date    BLADDER REMOVAL      HYSTERECTOMY, TOTAL ABDOMINAL       History reviewed. No pertinent family history. Outpatient Medications Marked as Taking for the 21 encounter (Office Visit) with Huey Dugan MD   Medication Sig Dispense Refill    losartan (COZAAR) 50 MG tablet Take 1 tablet by mouth once daily 90 tablet 2    Calcium Carb-Cholecalciferol (OYSCO D) 250-125 MG-UNIT TABS Take 1 tablet by mouth daily 30 tablet 11       Patient has no known allergies. Social History     Tobacco Use   Smoking Status Former Smoker    Packs/day: 1.00    Years: 30.00    Pack years: 30.00    Quit date: 2014    Years since quittin.8   Smokeless Tobacco Never Used      (If patient a smoker, smoking cessation counseling offered)   Social History     Substance and Sexual Activity   Alcohol Use Yes       REVIEW OF SYSTEMS:  Constitutional: negative  Eyes: negative  Respiratory: negative  Cardiovascular: negative  Gastrointestinal: negative  Genitourinary: see HPI  Musculoskeletal: negative  Skin: negative   Neurological: negative  Hematological/Lymphatic: negative  Psychological: negative        Physical Exam:    This a 61 y.o. female  Vitals:    21 1534   Resp: 14     Body mass index is 25.91 kg/m². Constitutional: Patient in no acute distress;         Assessment and Plan        1. Malignant neoplasm of urinary bladder, unspecified site ([address]    2. Atrophy of right kidney               Plan:       Atrophic right kidney- will monitor BMP. Repeat in one year. Bladder cancer- CT abdomen/pelvis without contrast and a Chest xray. Return in 1 year with imaging and bmp (for two more years)          Prescriptions Ordered:  No orders of the defined types were placed in this encounter. Orders Placed:  No orders of the defined types were placed in this encounter.            Justin Egan MD

## 2021-09-28 LAB
ALBUMIN: 4.1
BUN BLDV-MCNC: 28 MG/DL
CALCIUM SERPL-MCNC: 9.4 MG/DL
CHLORIDE BLD-SCNC: 102 MMOL/L
CO2: 23 MMOL/L
CREAT SERPL-MCNC: 1.8 MG/DL
CREATININE, RANDOM URINE: 38.1
GFR CALCULATED: 29
GLUCOSE BLD-MCNC: 85 MG/DL
POTASSIUM SERPL-SCNC: 4.5 MMOL/L
PROTEIN, URINE, RANDOM: 36
SODIUM BLD-SCNC: 134 MMOL/L

## 2021-10-04 ENCOUNTER — OFFICE VISIT (OUTPATIENT)
Dept: NEPHROLOGY | Age: 66
End: 2021-10-04
Payer: MEDICARE

## 2021-10-04 VITALS
BODY MASS INDEX: 26.23 KG/M2 | OXYGEN SATURATION: 98 % | HEART RATE: 70 BPM | SYSTOLIC BLOOD PRESSURE: 145 MMHG | DIASTOLIC BLOOD PRESSURE: 92 MMHG | WEIGHT: 170 LBS | TEMPERATURE: 97.2 F

## 2021-10-04 DIAGNOSIS — N18.4 CKD (CHRONIC KIDNEY DISEASE) STAGE 4, GFR 15-29 ML/MIN (HCC): Primary | ICD-10-CM

## 2021-10-04 DIAGNOSIS — N02.8 IGA NEPHROPATHY, ACUTE: ICD-10-CM

## 2021-10-04 PROCEDURE — 99213 OFFICE O/P EST LOW 20 MIN: CPT | Performed by: INTERNAL MEDICINE

## 2021-10-04 PROCEDURE — G8419 CALC BMI OUT NRM PARAM NOF/U: HCPCS | Performed by: INTERNAL MEDICINE

## 2021-10-04 PROCEDURE — 3017F COLORECTAL CA SCREEN DOC REV: CPT | Performed by: INTERNAL MEDICINE

## 2021-10-04 PROCEDURE — G8427 DOCREV CUR MEDS BY ELIG CLIN: HCPCS | Performed by: INTERNAL MEDICINE

## 2021-10-04 PROCEDURE — G8484 FLU IMMUNIZE NO ADMIN: HCPCS | Performed by: INTERNAL MEDICINE

## 2021-10-04 PROCEDURE — 1036F TOBACCO NON-USER: CPT | Performed by: INTERNAL MEDICINE

## 2021-10-04 NOTE — PROGRESS NOTES
[facility]    [facility], [address]   [place] 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 She was hospitalized in October 2018 with MSSA bacteremia and L4-L5 discitis. She developed LOUISA during that admission with a peak creatinine of 2.8 that improved to 1.4 on discharge. She again was hospitalized a month later in November 2018 readmitted with worsening kidney function and significant proteinuria 17 to 18 g. She also had persistent blood and microscopic hematuria. Her serologies that were sent were all negative. 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 She was treated with high-dose steroids followed by p.o. prednisone and she was also treated with Cytoxan 500 mg IV for 4 doses, her last dose was in April 2019. Additional history includes bladder CA s/p radical cystectomy August 2018 chemotherapy. she is following with Dr. Barbara Camarillo, urology. CT scans are showing no evidence of recurrence. Overall feeling well. She has been weaned off Prednisone 4 months ago. BP is high today she only takes her losartan occasionally, sometimes she misses it. Does not check her blood pressures at home. No edema. No gross hematuria. Pertinent items are noted in HPI.          Past History:  Past Medical History:   Diagnosis Date    Bladder [facility] ([facility] [address]     Hypertension     IgA nephropathy     Rapidly progressive glomerulonephritis      Past Surgical History:   Procedure Laterality Date    BLADDER REMOVAL      HYSTERECTOMY, TOTAL ABDOMINAL          VITALS:  BP (!) 145/92 (Site: Left Upper Arm, Position: Sitting, Cuff Size: Large Adult)   Pulse 70   Temp 97.2 °F (36.2 °C) (Temporal)   Wt 170 lb (77.1 kg)   SpO2 98%   BMI 26.23 kg/m²   Wt Readings from Last 3 Encounters:   10/04/21 170 lb (77.1 kg)   09/14/21 167 lb 14.4 oz (76.2 kg)   06/07/21 166 lb (75.3 kg)     Body mass index is 26.23 kg/m². General Appearance: alert and cooperative with exam, appears comfortable, no distress  HEENT: EOMI, moist oral mucus membranes  Neck: No jugular venous distention  Lungs: Air entry B/L, no crackles or rales, no use of accessory muscles  Heart: S1, S2 heard, no rub  GI: soft, non-tender, +urostomy  Extremities: No sig LE edema, no cyanosis  Skin: warm, dry  Neurologic: no tremor, no asterixis, no focal neurologic deficits     Medications:  Current Outpatient Medications   Medication Sig Dispense Refill    losartan (COZAAR) 50 MG tablet Take 1 tablet by mouth once daily 90 tablet 2    Calcium Carb-Cholecalciferol (OYSCO D) 250-125 MG-UNIT TABS Take 1 tablet by mouth daily 30 tablet 11     No current facility-administered medications for this visit.         Laboratory & Diagnostics:  CBC:   Lab Results   Component Value Date    WBC 4.9 06/02/2021    HGB 13.4 06/02/2021    HCT 38.0 06/02/2021    MCV 97.2 06/02/2021     06/02/2021     BMP:    Lab Results   Component Value Date     09/28/2021     07/09/2021     06/02/2021    K 4.5 09/28/2021    K 4.0 07/09/2021    K 4.3 06/02/2021     09/28/2021     07/09/2021     06/02/2021    CO2 23 09/28/2021    CO2 28 07/09/2021    CO2 25 06/02/2021    BUN 28 09/28/2021    BUN 26 07/09/2021    BUN 38 (H) 06/02/2021    CREATININE 1.8 09/28/2021    CREATININE 1.8 07/09/2021    CREATININE 1.8 (H) 06/02/2021    GLUCOSE 85 09/28/2021    GLUCOSE 141 07/09/2021    GLUCOSE 92 06/02/2021      Hepatic:   Lab Results Component Value Date    AST 10 10/16/2019    ALT 7 (L) 10/16/2019    BILITOT <0.2 (L) 10/16/2019    ALKPHOS 67 10/16/2019     BNP: No results found for: BNP  Lipids: No results found for: CHOL, HDL  INR: No results found for: INR  URINE:   Lab Results   Component Value Date    PROTUR 23 06/02/2021     Lab Results   Component Value Date    NITRU Positive 10/30/2020    COLORU Yellow 10/30/2020    PHUR 8.0 10/30/2020    LABCAST NONE SEEN 10/16/2019    WBCUA 15-25 10/16/2019    RBCUA 5-10 10/16/2019    BACTERIA MODERATE 10/16/2019    CLARITYU Clear 10/30/2020    SPECGRAV 1.010 10/16/2019    LEUKOCYTESUR Positive 10/30/2020    UROBILINOGEN Normal 10/30/2020    BILIRUBINUR Negative 10/30/2020    BLOODU Positive 10/30/2020    GLUCOSEU negative 10/30/2020    KETUA Negative 10/30/2020    AMORPHOUS DEBRIS 10/16/2019      Microalbumen/Creatinine ratio:  No components found for: RUCREAT        Impression/Plan:   1. 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 Stopped prednisone in June 2021. Goals of care include slowing rate of progression by controlling blood pressure, blood glucoses and albuminuria and by avoiding nephrotoxins such as NSAIDs and IV contrast.  2. Proteinuria - about 1 gram, continue Losartan  3. HTN: running higher does not take Losartan on regular basis advised to take regularly, monitor Bps at home and call with readings  4. Bladder CA s/p radical cystectomy Aug 2018. Following with urology. 5. Chronic bladder colonization    Bloodwork and medications were reviewed and plan of care discussed with the patient. Return in 4 months or sooner if the need arises.        Brenton Reynolds, DO  [facility]

## 2022-04-14 LAB
ALBUMIN SERPL-MCNC: 4.2 G/DL (ref 3.5–5)
BUN BLDV-MCNC: 29 MG/DL (ref 7–22)
CALCIUM SERPL-MCNC: 9.4 MG/DL (ref 8.4–10.2)
CHLORIDE BLD-SCNC: 101 MEQ/L (ref 98–107)
CO2: 27 MEQ/L (ref 22–31)
CREAT SERPL-MCNC: 1.7 MG/DL (ref 0.4–1.1)
CREAT SERPL-MCNC: 62.7 MG/DL
GFR SERPL CREATININE-BSD FRML MDRD: 30 ML/MIN/{1.73_M2}
GLUCOSE: 96 MG/DL (ref 70–126)
HCT VFR BLD CALC: 38.9 % (ref 37–47)
HEMOGLOBIN: 13.4 G/DL (ref 12–16)
MCH RBC QN AUTO: 33.4 PG (ref 28–32)
MCHC RBC AUTO-ENTMCNC: 34.4 G/DL (ref 33–37)
MCV RBC AUTO: 97.1 FL (ref 81–99)
PDW BLD-RTO: 14 % (ref 11.5–14.5)
PHOSPHORUS: 3.8 MG/DL (ref 2.5–4.6)
PLATELET # BLD: 288 THOU/CUMM (ref 130–400)
POTASSIUM SERPL-SCNC: 4.5 MEQ/L (ref 3.5–5.1)
PROTEIN, URINE: 48 MG/DL (ref 0–10)
RBC: 4.01 MIL/CUMM (ref 4.2–5.4)
SODIUM BLD-SCNC: 135 MEQ/L (ref 136–145)
WBC: 5.4 THOU/CUMM (ref 4.8–10.8)

## 2022-04-15 LAB
PARATHYROID HORMONE INTACT: 65.3 PG/ML (ref 15–65)
VITAMIN D 25-HYDROXY: 35 NG/ML (ref 30–100)

## 2022-04-21 ENCOUNTER — OFFICE VISIT (OUTPATIENT)
Dept: NEPHROLOGY | Age: 67
End: 2022-04-21
Payer: MEDICARE

## 2022-04-21 VITALS
DIASTOLIC BLOOD PRESSURE: 85 MMHG | BODY MASS INDEX: 26.39 KG/M2 | TEMPERATURE: 97.5 F | HEART RATE: 76 BPM | WEIGHT: 171 LBS | OXYGEN SATURATION: 98 % | SYSTOLIC BLOOD PRESSURE: 120 MMHG

## 2022-04-21 DIAGNOSIS — R80.1 PERSISTENT PROTEINURIA: ICD-10-CM

## 2022-04-21 DIAGNOSIS — N18.32 STAGE 3B CHRONIC KIDNEY DISEASE (HCC): Primary | ICD-10-CM

## 2022-04-21 PROCEDURE — 99213 OFFICE O/P EST LOW 20 MIN: CPT | Performed by: INTERNAL MEDICINE

## 2022-04-21 NOTE — PROGRESS NOTES
[facility]    [facility], [address] [place] [address],[address]   [facility], [address]  [phone]  Progress Note  2022 3:14 PM      Pt Name:    Martha Reddy:    1955  Primary Care Physician:  Essence Mckeon MD     Chief Complaint:   Chief Complaint   Patient presents with    Follow-up     CKD IV         History of Present Illness: This is a follow-up visit for CKD IV from IgA Nephropathy. She was hospitalized in 2018 with MSSA bacteremia and L4-L5 discitis. She developed LOUISA during that admission with a peak creatinine of 2.8 that improved to 1.4 on discharge. She again was hospitalized a month later in 2018 readmitted with worsening kidney function and significant proteinuria 17 to 18 g. She also had persistent blood and microscopic hematuria. Her serologies that were sent were all negative. 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 She was treated with high-dose steroids followed by p.o. prednisone and she was also treated with Cytoxan 500 mg IV for 4 doses, her last dose was in 2019. Additional history includes bladder CA s/p radical cystectomy 2018 chemotherapy. she is following with Dr. Ike Estrada, urology. CT scans are showing no evidence of recurrence. Patient doing well. No recent illnesses. BP controlled. No gross hematuria. Pertinent items are noted in HPI.          Past History:  Past Medical History:   Diagnosis Date    Bladder cancer ([address]     Hypertension     IgA nephropathy     Rapidly progressive glomerulonephritis      Past Surgical History:   Procedure Laterality Date    BLADDER REMOVAL      HYSTERECTOMY, TOTAL ABDOMINAL          VITALS:  /85 (Site: Right Upper Arm, Position: Sitting, Cuff Size: Large Adult)   Pulse 76   Temp 97.5 °F (36.4 °C) (Temporal)   Wt 171 lb (77.6 kg)   SpO2 98%   BMI 26.39 kg/m²   Wt Readings from Last 3 Encounters:   04/21/22 171 lb (77.6 kg)   10/04/21 170 lb (77.1 kg)   09/14/21 167 lb 14.4 oz (76.2 kg)     Body mass index is 26.39 kg/m². General Appearance: alert and cooperative with exam, appears comfortable, no distress  HEENT: EOMI, moist oral mucus membranes  Neck: No jugular venous distention  Lungs: Air entry B/L, no crackles or rales, no use of accessory muscles  Heart: S1, S2 heard, no rub  GI: soft, non-tender, +urostomy  Extremities: No sig LE edema, no cyanosis  Skin: warm, dry  Neurologic: no tremor, no asterixis, no focal neurologic deficits     Medications:  Current Outpatient Medications   Medication Sig Dispense Refill    losartan (COZAAR) 50 MG tablet Take 1 tablet by mouth once daily 90 tablet 2    Calcium Carb-Cholecalciferol (OYSCO D) 250-125 MG-UNIT TABS Take 1 tablet by mouth daily 30 tablet 11     No current facility-administered medications for this visit.         Laboratory & Diagnostics:  CBC:   Lab Results   Component Value Date    WBC 5.4 04/14/2022    HGB 13.4 04/14/2022    HCT 38.9 04/14/2022    MCV 97.1 04/14/2022     04/14/2022     BMP:    Lab Results   Component Value Date     (L) 04/14/2022     09/28/2021     07/09/2021    K 4.5 04/14/2022    K 4.5 09/28/2021    K 4.0 07/09/2021     04/14/2022     09/28/2021     07/09/2021    CO2 27 04/14/2022    CO2 23 09/28/2021    CO2 28 07/09/2021    BUN 29 (H) 04/14/2022    BUN 28 09/28/2021    BUN 26 07/09/2021    CREATININE 62.7 04/14/2022    CREATININE 1.7 (H) 04/14/2022    CREATININE 1.8 09/28/2021    GLUCOSE 96 04/14/2022    GLUCOSE 85 09/28/2021    GLUCOSE 141 07/09/2021      Hepatic:   Lab Results   Component Value Date    AST 10 10/16/2019    ALT 7 (L) 10/16/2019    BILITOT <0.2 (L) 10/16/2019    ALKPHOS 67 10/16/2019     BNP: No results found for: BNP  Lipids: No results found for: CHOL, HDL  INR: No results found for: INR  URINE:   Lab Results   Component Value Date    PROTUR 48 04/14/2022     Lab Results   Component Value Date    NITRU Positive 10/30/2020    COLORU Yellow 10/30/2020    PHUR 8.0 10/30/2020    LABCAST NONE SEEN 10/16/2019    WBCUA 15-25 10/16/2019    RBCUA 5-10 10/16/2019    BACTERIA MODERATE 10/16/2019    CLARITYU Clear 10/30/2020    SPECGRAV 1.010 10/16/2019    LEUKOCYTESUR Positive 10/30/2020    UROBILINOGEN Normal 10/30/2020    BILIRUBINUR Negative 10/30/2020    BLOODU Positive 10/30/2020    GLUCOSEU negative 10/30/2020    KETUA Negative 10/30/2020    AMORPHOUS DEBRIS 10/16/2019      Microalbumen/Creatinine ratio:  No components found for: RUCREAT        Impression/Plan:   1. 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 Stopped prednisone in June 2021. Overall stable. - Goals of care include slowing rate of progression by controlling blood pressure, blood glucoses and albuminuria and by avoiding nephrotoxins such as NSAIDs and IV contrast.  2. Proteinuria - < 1 gram, continue Losartan  3. HTN:better  4. Bladder CA s/p radical cystectomy Aug 2018. Following with urology. 5. Chronic bladder colonization    Bloodwork and medications were reviewed and plan of care discussed with the patient. Return in 6 months or sooner if the need arises.        Marcell Notice, DO  [facility]

## 2022-09-15 RX ORDER — LOSARTAN POTASSIUM 50 MG/1
50 TABLET ORAL DAILY
Qty: 90 TABLET | Refills: 1 | Status: SHIPPED | OUTPATIENT
Start: 2022-09-15 | End: 2022-12-14

## 2022-09-19 DIAGNOSIS — C67.9 MALIGNANT NEOPLASM OF URINARY BLADDER, UNSPECIFIED SITE (HCC): Primary | ICD-10-CM

## 2022-10-17 LAB
BUN BLDV-MCNC: 41 MG/DL (ref 7–22)
CALCIUM SERPL-MCNC: 9.2 MG/DL (ref 8.4–10.2)
CHLORIDE BLD-SCNC: 105 MEQ/L (ref 98–107)
CO2: 26 MEQ/L (ref 22–31)
CREAT SERPL-MCNC: 1.6 MG/DL (ref 0.4–1.1)
CREATININE, URINE: 52.6 MG/DL
GFR SERPL CREATININE-BSD FRML MDRD: 32 ML/MIN/{1.73_M2}
GLUCOSE: 93 MG/DL (ref 70–126)
MICROALBUMIN UR-MCNC: 31.24 MG/DL
MICROALBUMIN/CREAT UR-RTO: 594 MG/G (ref 0–30)
POTASSIUM SERPL-SCNC: 4.4 MEQ/L (ref 3.5–5.1)
SODIUM BLD-SCNC: 138 MEQ/L (ref 136–145)

## 2022-10-20 ENCOUNTER — OFFICE VISIT (OUTPATIENT)
Dept: NEPHROLOGY | Age: 67
End: 2022-10-20
Payer: MEDICARE

## 2022-10-20 VITALS
BODY MASS INDEX: 25.77 KG/M2 | DIASTOLIC BLOOD PRESSURE: 88 MMHG | HEART RATE: 88 BPM | WEIGHT: 167 LBS | OXYGEN SATURATION: 98 % | SYSTOLIC BLOOD PRESSURE: 132 MMHG

## 2022-10-20 DIAGNOSIS — N02.8 IGA NEPHROPATHY: ICD-10-CM

## 2022-10-20 DIAGNOSIS — N18.32 STAGE 3B CHRONIC KIDNEY DISEASE (HCC): Primary | ICD-10-CM

## 2022-10-20 DIAGNOSIS — R80.1 PERSISTENT PROTEINURIA: ICD-10-CM

## 2022-10-20 DIAGNOSIS — N02.8 IGA NEPHROPATHY, ACUTE: ICD-10-CM

## 2022-10-20 PROCEDURE — 1123F ACP DISCUSS/DSCN MKR DOCD: CPT | Performed by: INTERNAL MEDICINE

## 2022-10-20 PROCEDURE — 99213 OFFICE O/P EST LOW 20 MIN: CPT | Performed by: INTERNAL MEDICINE

## 2022-10-20 NOTE — PROGRESS NOTES
[facility]    [facility], [address]   [address] [facility], \[facility] [place] [facility]\""  [phone]  Progress Note  10/20/2022 3:48 PM      Pt Name:    Charles Zuleta  YOB: 1955  Primary Care Physician:  Lisseth Mendez MD     Chief Complaint:   Chief Complaint   Patient presents with    Follow-up     CKD III        History of Present Illness: This is a follow-up visit for CKD IV from IgA Nephropathy. She was hospitalized in October 2018 with MSSA bacteremia and L4-L5 discitis. She developed LOUISA during that admission with a peak creatinine of 2.8 that improved to 1.4 on discharge. She again was hospitalized a month later in November 2018 readmitted with worsening kidney function and significant proteinuria 17 to 18 g. She also had persistent blood and microscopic hematuria. Her serologies that were sent were all negative. 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 She was treated with high-dose steroids followed by p.o. prednisone and she was also treated with Cytoxan 500 mg IV for 4 doses, her last dose was in April 2019. Additional history includes bladder CA s/p radical cystectomy August 2018 chemotherapy. she is following with Dr. Brittney Arciniega, urology. CT scans are showing no evidence of recurrence. She is doing well. Has chronic frothy urine but no issues with edema. BP stable. Pertinent items are noted in HPI.          Past History:  Past Medical History:   Diagnosis Date    Bladder cancer ([facility] [address]     Hypertension     IgA nephropathy     Rapidly progressive glomerulonephritis      Past Surgical History:   Procedure Laterality Date    BLADDER REMOVAL      HYSTERECTOMY, TOTAL ABDOMINAL (CERVIX REMOVED)          VITALS:  /88 (Site: Right Upper Arm, Position: Sitting, Cuff Size: Large Adult)   Pulse 88   Wt 167 lb (75.8 kg)   SpO2 98%   BMI 25.77 kg/m²   Wt Readings from Last 3 Encounters:   10/20/22 167 lb (75.8 kg)   04/21/22 171 lb (77.6 kg)   10/04/21 170 lb (77.1 kg)     Body mass index is 25.77 kg/m². General Appearance: alert and cooperative with exam, appears comfortable, no distress  HEENT: EOMI, moist oral mucus membranes  Neck: No jugular venous distention  Lungs: Air entry B/L, no crackles or rales, no use of accessory muscles  Heart: S1, S2 heard, no rub  GI: soft, non-tender, +urostomy  Extremities: No sig LE edema, no cyanosis  Skin: warm, dry  Neurologic: no tremor, no asterixis,     Medications:  Current Outpatient Medications   Medication Sig Dispense Refill    losartan (COZAAR) 50 MG tablet Take 1 tablet by mouth daily 90 tablet 1    Calcium Carb-Cholecalciferol (OYSCO D) 250-125 MG-UNIT TABS Take 1 tablet by mouth daily 30 tablet 11     No current facility-administered medications for this visit.         Laboratory & Diagnostics:  CBC:   Lab Results   Component Value Date    WBC 5.4 04/14/2022    HGB 13.4 04/14/2022    HCT 38.9 04/14/2022    MCV 97.1 04/14/2022     04/14/2022     BMP:    Lab Results   Component Value Date     10/17/2022     (L) 04/14/2022     09/28/2021    K 4.4 10/17/2022    K 4.5 04/14/2022    K 4.5 09/28/2021     10/17/2022     04/14/2022     09/28/2021    CO2 26 10/17/2022    CO2 27 04/14/2022    CO2 23 09/28/2021    BUN 41 (H) 10/17/2022    BUN 29 (H) 04/14/2022    BUN 28 09/28/2021    CREATININE 1.6 (H) 10/17/2022    CREATININE 62.7 04/14/2022    CREATININE 1.7 (H) 04/14/2022    GLUCOSE 93 10/17/2022    GLUCOSE 96 04/14/2022    GLUCOSE 85 09/28/2021      Hepatic:   Lab Results   Component Value Date    AST 10 10/16/2019    ALT 7 (L) 10/16/2019    BILITOT <0.2 (L) 10/16/2019    ALKPHOS 67 10/16/2019     BNP: No results found for: BNP  Lipids: No results found for: CHOL, HDL  INR: No results found for: INR  URINE:   Lab Results   Component Value Date/Time    PROTUR 48 04/14/2022 10:58 AM     Lab Results   Component Value Date/Time    NITRU Positive 10/30/2020 12:00 AM    COLORU Yellow 10/30/2020 12:00 AM    PHUR 8.0 10/30/2020 12:00 AM    LABCAST NONE SEEN 10/16/2019 03:46 PM    WBCUA 15-25 10/16/2019 03:46 PM    RBCUA 5-10 10/16/2019 03:46 PM    BACTERIA MODERATE 10/16/2019 03:46 PM    CLARITYU Clear 10/30/2020 12:00 AM    SPECGRAV 1.010 10/16/2019 03:46 PM    LEUKOCYTESUR Positive 10/30/2020 12:00 AM    UROBILINOGEN Normal 10/30/2020 12:00 AM    BILIRUBINUR Negative 10/30/2020 12:00 AM    BLOODU Positive 10/30/2020 12:00 AM    GLUCOSEU negative 10/30/2020 12:00 AM    KETUA Negative 10/30/2020 12:00 AM    AMORPHOUS DEBRIS 10/16/2019 03:46 PM      Microalbumen/Creatinine ratio:  No components found for: RUCREAT        Impression/Plan:   1. CKD IIIb  due to  IgA Nephropathy, s/p treatment with pulse dose steroids and 4 doses of IV Cytoxan (last dose April 2019). Stopped prednisone in June 2021. Overall stable. Proteinuria remains stable  - Goals of care include slowing rate of progression by controlling blood pressure, blood glucoses and albuminuria and by avoiding nephrotoxins such as NSAIDs and IV contrast.  2. Proteinuria - < 1 gram, continue Losartan  3. HTN: stable  4. Bladder CA s/p radical cystectomy Aug 2018. Following with urology. 5. Chronic bladder colonization    Bloodwork and medications were reviewed and plan of care discussed with the patient. Return in 9 months or sooner if the need arises.        Brooklyn Shukla, DO  [facility]

## 2022-10-25 ENCOUNTER — OFFICE VISIT (OUTPATIENT)
Dept: UROLOGY | Age: 67
End: 2022-10-25
Payer: MEDICARE

## 2022-10-25 ENCOUNTER — HOSPITAL ENCOUNTER (OUTPATIENT)
Dept: CT IMAGING | Age: 67
Discharge: HOME | End: 2022-10-25

## 2022-10-25 VITALS — BODY MASS INDEX: 25.01 KG/M2 | HEIGHT: 68 IN | WEIGHT: 165 LBS

## 2022-10-25 DIAGNOSIS — C67.9 MALIGNANT NEOPLASM OF URINARY BLADDER, UNSPECIFIED SITE (HCC): Primary | ICD-10-CM

## 2022-10-25 DIAGNOSIS — N26.1 ATROPHY OF RIGHT KIDNEY: ICD-10-CM

## 2022-10-25 DIAGNOSIS — R52 PAIN: ICD-10-CM

## 2022-10-25 PROCEDURE — 1123F ACP DISCUSS/DSCN MKR DOCD: CPT | Performed by: UROLOGY

## 2022-10-25 PROCEDURE — 99213 OFFICE O/P EST LOW 20 MIN: CPT | Performed by: UROLOGY

## 2022-10-25 NOTE — PROGRESS NOTES
[address] [place], [place] [address]  Dept: [phone]  Dept Fax: [phone] : [phone]    [facility] [facility] Office Note -     Patient:  Galdino Otero  YOB: 1955  Date: 10/25/2022    The patient is a 74 y.o. female who presents today for evaluation of the following problems: bladder cancer  Chief Complaint   Patient presents with    Follow-up     Imaging done at [facility] [address] BMP obtained. referred/consultation requested by Lavell Darden MD.    HISTORY OF PRESENT ILLNESS:       Bladder cancer  imaging reviewed today no recurrence  No issues with conduit  Doing very well  No UTIs    Atrophic right kidney  No flank pain  Creat 1.6    Summary of Previous Records:  Hx of cystectomy with ileal conduit months ago  Here to establish care  No recent UTI  No pain  No bone pain, no weight loss  No staging imaging since surgery. Requested/reviewed records from Lavell Darden MD office and/or outside physician/EMR    (Patient's old records have been requested, reviewed and pertinent findings summarized in today's note.)    Procedures Today: N/A    Last several PSA's:  No results found for: PSA    Last total testosterone:  No results found for: TESTOSTERONE    Urinalysis today:  No results found for this visit on 10/25/22. Last BUN and creatinine:  Lab Results   Component Value Date    BUN 41 (H) 10/17/2022     Lab Results   Component Value Date    CREATININE 1.6 (H) 10/17/2022       Imaging Reviewed during this Office Visit:   Nikki Gibson MD independently reviewed the images and verified the radiology reports from:    imaging independently reviewed by Nikki Gibson MD and radiology report verified demonstrating     No results found.           PAST MEDICAL, FAMILY AND SOCIAL HISTORY:  Past Medical History:   Diagnosis Date    Bladder cancer ([facility] [address]     Hypertension     IgA nephropathy     Rapidly progressive glomerulonephritis      Past Surgical History:   Procedure Laterality Date    BLADDER REMOVAL      HYSTERECTOMY, TOTAL ABDOMINAL (CERVIX REMOVED)       No family history on file. Outpatient Medications Marked as Taking for the 10/25/22 encounter (Office Visit) with Keanu Crawford MD   Medication Sig Dispense Refill    losartan (COZAAR) 50 MG tablet Take 1 tablet by mouth daily 90 tablet 1       Patient has no known allergies. Social History     Tobacco Use   Smoking Status Former    Packs/day: 1.00    Years: 30.00    Pack years: 30.00    Types: Cigarettes    Quit date: 2014    Years since quittin.9   Smokeless Tobacco Never      (If patient a smoker, smoking cessation counseling offered)   Social History     Substance and Sexual Activity   Alcohol Use Yes       REVIEW OF SYSTEMS:  Constitutional: negative  Eyes: negative  Respiratory: negative  Cardiovascular: negative  Gastrointestinal: negative  Genitourinary: see HPI  Musculoskeletal: negative  Skin: negative   Neurological: negative  Hematological/Lymphatic: negative  Psychological: negative        Physical Exam:    This a 74 y.o. female  There were no vitals filed for this visit. Body mass index is 25.46 kg/m². Constitutional: Patient in no acute distress;         Assessment and Plan        1. Malignant neoplasm of urinary bladder, unspecified site ([facility] [address]    2. Atrophy of right kidney               Plan:       Atrophic right kidney- will monitor BMP. Repeat in one year. Bladder cancer- CT abdomen/pelvis without contrast a    Return in 1 year with imaging and bmp (for one more year)          Prescriptions Ordered:  No orders of the defined types were placed in this encounter. Orders Placed:  No orders of the defined types were placed in this encounter.            Soha Lakhani MD

## 2023-03-15 RX ORDER — LOSARTAN POTASSIUM 50 MG/1
50 TABLET ORAL DAILY
Qty: 90 TABLET | Refills: 3 | Status: SHIPPED | OUTPATIENT
Start: 2023-03-15 | End: 2023-06-13

## 2023-08-03 ENCOUNTER — NURSE ONLY (OUTPATIENT)
Age: 68
End: 2023-08-03

## 2023-08-03 DIAGNOSIS — N18.32 STAGE 3B CHRONIC KIDNEY DISEASE (HCC): ICD-10-CM

## 2023-08-03 DIAGNOSIS — R80.1 PERSISTENT PROTEINURIA: ICD-10-CM

## 2023-08-03 DIAGNOSIS — N02.8 IGA NEPHROPATHY: ICD-10-CM

## 2023-08-03 LAB
25(OH)D3 SERPL-MCNC: 50 NG/ML (ref 30–100)
ANION GAP SERPL CALC-SCNC: 10 MEQ/L (ref 8–16)
BUN SERPL-MCNC: 22 MG/DL (ref 7–22)
CALCIUM SERPL-MCNC: 9.6 MG/DL (ref 8.5–10.5)
CHLORIDE SERPL-SCNC: 101 MEQ/L (ref 98–111)
CO2 SERPL-SCNC: 26 MEQ/L (ref 23–33)
CREAT SERPL-MCNC: 1.6 MG/DL (ref 0.4–1.2)
CREAT UR-MCNC: 51.7 MG/DL
DEPRECATED RDW RBC AUTO: 49.8 FL (ref 35–45)
ERYTHROCYTE [DISTWIDTH] IN BLOOD BY AUTOMATED COUNT: 13.4 % (ref 11.5–14.5)
GFR SERPL CREATININE-BSD FRML MDRD: 35 ML/MIN/1.73M2
GLUCOSE SERPL-MCNC: 86 MG/DL (ref 70–108)
HCT VFR BLD AUTO: 42.4 % (ref 37–47)
HGB BLD-MCNC: 13.7 GM/DL (ref 12–16)
MCH RBC QN AUTO: 32.7 PG (ref 26–33)
MCHC RBC AUTO-ENTMCNC: 32.3 GM/DL (ref 32.2–35.5)
MCV RBC AUTO: 101.2 FL (ref 81–99)
MICROALBUMIN UR-MCNC: 17.53 MG/DL
MICROALBUMIN/CREAT RATIO PNL UR: 339 MG/G (ref 0–30)
PHOSPHATE SERPL-MCNC: 3.9 MG/DL (ref 2.4–4.7)
PLATELET # BLD AUTO: 238 THOU/MM3 (ref 130–400)
PMV BLD AUTO: 8.9 FL (ref 9.4–12.4)
POTASSIUM SERPL-SCNC: 4.3 MEQ/L (ref 3.5–5.2)
RBC # BLD AUTO: 4.19 MILL/MM3 (ref 4.2–5.4)
SODIUM SERPL-SCNC: 137 MEQ/L (ref 135–145)
WBC # BLD AUTO: 4.6 THOU/MM3 (ref 4.8–10.8)

## 2023-08-04 LAB — PTH-INTACT SERPL-MCNC: 54.3 PG/ML (ref 15–65)

## 2023-08-09 ENCOUNTER — OFFICE VISIT (OUTPATIENT)
Dept: NEPHROLOGY | Age: 68
End: 2023-08-09
Payer: MEDICARE

## 2023-08-09 VITALS
OXYGEN SATURATION: 97 % | DIASTOLIC BLOOD PRESSURE: 86 MMHG | SYSTOLIC BLOOD PRESSURE: 133 MMHG | BODY MASS INDEX: 25.77 KG/M2 | WEIGHT: 167 LBS | HEART RATE: 68 BPM

## 2023-08-09 DIAGNOSIS — N18.32 STAGE 3B CHRONIC KIDNEY DISEASE (HCC): Primary | ICD-10-CM

## 2023-08-09 PROCEDURE — 3079F DIAST BP 80-89 MM HG: CPT | Performed by: INTERNAL MEDICINE

## 2023-08-09 PROCEDURE — 1123F ACP DISCUSS/DSCN MKR DOCD: CPT | Performed by: INTERNAL MEDICINE

## 2023-08-09 PROCEDURE — G8427 DOCREV CUR MEDS BY ELIG CLIN: HCPCS | Performed by: INTERNAL MEDICINE

## 2023-08-09 PROCEDURE — 1036F TOBACCO NON-USER: CPT | Performed by: INTERNAL MEDICINE

## 2023-08-09 PROCEDURE — 3017F COLORECTAL CA SCREEN DOC REV: CPT | Performed by: INTERNAL MEDICINE

## 2023-08-09 PROCEDURE — 3075F SYST BP GE 130 - 139MM HG: CPT | Performed by: INTERNAL MEDICINE

## 2023-08-09 PROCEDURE — G8417 CALC BMI ABV UP PARAM F/U: HCPCS | Performed by: INTERNAL MEDICINE

## 2023-08-09 PROCEDURE — 1090F PRES/ABSN URINE INCON ASSESS: CPT | Performed by: INTERNAL MEDICINE

## 2023-08-09 PROCEDURE — 99213 OFFICE O/P EST LOW 20 MIN: CPT | Performed by: INTERNAL MEDICINE

## 2023-08-09 PROCEDURE — G8400 PT W/DXA NO RESULTS DOC: HCPCS | Performed by: INTERNAL MEDICINE

## 2023-08-09 NOTE — PROGRESS NOTES
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 She was hospitalized in October 2018 with MSSA bacteremia and L4-L5 discitis. She developed LOUISA during that admission with a peak creatinine of 2.8 that improved to 1.4 on discharge. She again was hospitalized a month later in November 2018 readmitted with worsening kidney function and significant proteinuria 17 to 18 g. She also had persistent blood and microscopic hematuria. Her serologies that were sent were all negative. 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 She was treated with high-dose steroids followed by p.o. prednisone and she was also treated with Cytoxan 500 mg IV for 4 doses, her last dose was in April 2019. Additional history includes bladder CA s/p radical cystectomy August 2018 chemotherapy. she is following with Dr. Zeina Truong, urology. CT scans are showing no evidence of recurrence. Doing well. Reports no concerns. Doesn't check bp at home but looks stable. No edema. Pertinent items are noted in HPI.          Past History:  Past Medical History:   Diagnosis Date    Bladder cancer ([address])     Hypertension     IgA nephropathy     Rapidly progressive glomerulonephritis      Past Surgical History:   Procedure Laterality Date    BLADDER REMOVAL      HYSTERECTOMY, TOTAL ABDOMINAL (CERVIX REMOVED)          VITALS:  /86 (Site: Left Upper Arm, Position: Sitting,

## 2023-09-11 DIAGNOSIS — C67.9 MALIGNANT NEOPLASM OF URINARY BLADDER, UNSPECIFIED SITE (HCC): Primary | ICD-10-CM

## 2023-09-12 ENCOUNTER — TELEPHONE (OUTPATIENT)
Dept: UROLOGY | Age: 68
End: 2023-09-12

## 2023-09-12 NOTE — TELEPHONE ENCOUNTER
Patient scheduled for CT ABD PELV WO  at [address] [facility] on 10/2/2023. Arrival of 1215PM for a 1230PM scan time. NPO 4 HOURS PRIOR TO SCAN.   Order mailed with instructions  to the patient

## 2023-10-24 ENCOUNTER — OFFICE VISIT (OUTPATIENT)
Dept: UROLOGY | Age: 68
End: 2023-10-24
Payer: MEDICARE

## 2023-10-24 VITALS
BODY MASS INDEX: 29.86 KG/M2 | HEIGHT: 68 IN | DIASTOLIC BLOOD PRESSURE: 76 MMHG | WEIGHT: 197 LBS | SYSTOLIC BLOOD PRESSURE: 118 MMHG

## 2023-10-24 DIAGNOSIS — C67.9 MALIGNANT NEOPLASM OF URINARY BLADDER, UNSPECIFIED SITE (HCC): Primary | ICD-10-CM

## 2023-10-24 DIAGNOSIS — N26.1 ATROPHY OF RIGHT KIDNEY: ICD-10-CM

## 2023-10-24 PROCEDURE — 1036F TOBACCO NON-USER: CPT | Performed by: UROLOGY

## 2023-10-24 PROCEDURE — 99213 OFFICE O/P EST LOW 20 MIN: CPT | Performed by: UROLOGY

## 2023-10-24 PROCEDURE — 1123F ACP DISCUSS/DSCN MKR DOCD: CPT | Performed by: UROLOGY

## 2023-10-24 PROCEDURE — G8400 PT W/DXA NO RESULTS DOC: HCPCS | Performed by: UROLOGY

## 2023-10-24 PROCEDURE — 3078F DIAST BP <80 MM HG: CPT | Performed by: UROLOGY

## 2023-10-24 PROCEDURE — G8427 DOCREV CUR MEDS BY ELIG CLIN: HCPCS | Performed by: UROLOGY

## 2023-10-24 PROCEDURE — 1090F PRES/ABSN URINE INCON ASSESS: CPT | Performed by: UROLOGY

## 2023-10-24 PROCEDURE — 3074F SYST BP LT 130 MM HG: CPT | Performed by: UROLOGY

## 2023-10-24 PROCEDURE — G8484 FLU IMMUNIZE NO ADMIN: HCPCS | Performed by: UROLOGY

## 2023-10-24 PROCEDURE — G8417 CALC BMI ABV UP PARAM F/U: HCPCS | Performed by: UROLOGY

## 2023-10-24 PROCEDURE — 3017F COLORECTAL CA SCREEN DOC REV: CPT | Performed by: UROLOGY

## 2023-10-24 NOTE — PROGRESS NOTES
Procedures    US RETROPERITONEAL COMPLETE     Standing Status:   Future     Standing Expiration Date:   4/24/2025              Nivia Paniagua MD

## 2024-03-18 RX ORDER — LOSARTAN POTASSIUM 50 MG/1
50 TABLET ORAL DAILY
Qty: 90 TABLET | Refills: 0 | Status: SHIPPED | OUTPATIENT
Start: 2024-03-18

## 2024-05-07 ENCOUNTER — NURSE ONLY (OUTPATIENT)
Age: 69
End: 2024-05-07

## 2024-05-07 LAB
BASOPHILS ABSOLUTE: 0 THOU/MM3 (ref 0–0.1)
BASOPHILS NFR BLD AUTO: 0.8 %
DEPRECATED RDW RBC AUTO: 48.3 FL (ref 35–45)
EOSINOPHIL NFR BLD AUTO: 1 %
EOSINOPHILS ABSOLUTE: 0.1 THOU/MM3 (ref 0–0.4)
ERYTHROCYTE [DISTWIDTH] IN BLOOD BY AUTOMATED COUNT: 13.1 % (ref 11.5–14.5)
HCT VFR BLD AUTO: 44.4 % (ref 37–47)
HGB BLD-MCNC: 14.7 GM/DL (ref 12–16)
IMM GRANULOCYTES # BLD AUTO: 0.01 THOU/MM3 (ref 0–0.07)
IMM GRANULOCYTES NFR BLD AUTO: 0.2 %
LYMPHOCYTES ABSOLUTE: 0.9 THOU/MM3 (ref 1–4.8)
LYMPHOCYTES NFR BLD AUTO: 17.8 %
MCH RBC QN AUTO: 33.2 PG (ref 26–33)
MCHC RBC AUTO-ENTMCNC: 33.1 GM/DL (ref 32.2–35.5)
MCV RBC AUTO: 100.2 FL (ref 81–99)
MONOCYTES ABSOLUTE: 0.5 THOU/MM3 (ref 0.4–1.3)
MONOCYTES NFR BLD AUTO: 9 %
NEUTROPHILS ABSOLUTE: 3.6 THOU/MM3 (ref 1.8–7.7)
NEUTROPHILS NFR BLD AUTO: 71.2 %
NRBC BLD AUTO-RTO: 0 /100 WBC
PLATELET # BLD AUTO: 255 THOU/MM3 (ref 130–400)
PMV BLD AUTO: 9.4 FL (ref 9.4–12.4)
RBC # BLD AUTO: 4.43 MILL/MM3 (ref 4.2–5.4)
WBC # BLD AUTO: 5.1 THOU/MM3 (ref 4.8–10.8)

## 2024-05-08 LAB
ALBUMIN SERPL BCG-MCNC: 4.3 G/DL (ref 3.5–5.1)
ALP SERPL-CCNC: 67 U/L (ref 38–126)
ALT SERPL W/O P-5'-P-CCNC: 10 U/L (ref 11–66)
ANION GAP SERPL CALC-SCNC: 15 MEQ/L (ref 8–16)
AST SERPL-CCNC: 15 U/L (ref 5–40)
BILIRUB SERPL-MCNC: 0.5 MG/DL (ref 0.3–1.2)
BUN SERPL-MCNC: 22 MG/DL (ref 7–22)
CALCIUM SERPL-MCNC: 9.7 MG/DL (ref 8.5–10.5)
CHLORIDE SERPL-SCNC: 100 MEQ/L (ref 98–111)
CHOLEST SERPL-MCNC: 205 MG/DL (ref 100–199)
CO2 SERPL-SCNC: 24 MEQ/L (ref 23–33)
CREAT SERPL-MCNC: 1.5 MG/DL (ref 0.4–1.2)
GFR SERPL CREATININE-BSD FRML MDRD: 38 ML/MIN/1.73M2
GLUCOSE SERPL-MCNC: 108 MG/DL (ref 70–108)
HDLC SERPL-MCNC: 75 MG/DL
LDLC SERPL CALC-MCNC: 114 MG/DL
POTASSIUM SERPL-SCNC: 4.2 MEQ/L (ref 3.5–5.2)
PROT SERPL-MCNC: 7 G/DL (ref 6.1–8)
SODIUM SERPL-SCNC: 139 MEQ/L (ref 135–145)
T4 FREE SERPL-MCNC: 1.32 NG/DL (ref 0.93–1.68)
TRIGL SERPL-MCNC: 82 MG/DL (ref 0–199)
TSH SERPL DL<=0.005 MIU/L-ACNC: 1.67 UIU/ML (ref 0.4–4.2)

## 2024-06-14 RX ORDER — LOSARTAN POTASSIUM 50 MG/1
50 TABLET ORAL DAILY
Qty: 90 TABLET | Refills: 0 | Status: SHIPPED | OUTPATIENT
Start: 2024-06-14

## 2024-07-10 ENCOUNTER — TELEPHONE (OUTPATIENT)
Dept: UROLOGY | Age: 69
End: 2024-07-10

## 2024-07-10 NOTE — TELEPHONE ENCOUNTER
Patient scheduled for US RETROPERITONEAL COMP  at [facility] on 10/21/2024.  Arrival of 1100 for a 1115 scan time.  Order mailed with instructions to the patient

## 2024-08-08 DIAGNOSIS — N18.4 CKD (CHRONIC KIDNEY DISEASE) STAGE 4, GFR 15-29 ML/MIN (HCC): Primary | ICD-10-CM

## 2024-08-08 DIAGNOSIS — N02.B9 IGA NEPHROPATHY, ACUTE: ICD-10-CM

## 2024-08-08 DIAGNOSIS — R80.1 PERSISTENT PROTEINURIA: ICD-10-CM

## 2024-08-08 DIAGNOSIS — I10 ESSENTIAL HYPERTENSION: ICD-10-CM

## 2024-08-12 ENCOUNTER — LAB (OUTPATIENT)
Age: 69
End: 2024-08-12

## 2024-08-12 DIAGNOSIS — N02.B9 IGA NEPHROPATHY, ACUTE: ICD-10-CM

## 2024-08-12 DIAGNOSIS — R80.1 PERSISTENT PROTEINURIA: ICD-10-CM

## 2024-08-12 DIAGNOSIS — N18.4 CKD (CHRONIC KIDNEY DISEASE) STAGE 4, GFR 15-29 ML/MIN (HCC): ICD-10-CM

## 2024-08-12 DIAGNOSIS — I10 ESSENTIAL HYPERTENSION: ICD-10-CM

## 2024-08-12 LAB
CREAT UR-MCNC: 23.6 MG/DL
DEPRECATED RDW RBC AUTO: 54 FL (ref 35–45)
ERYTHROCYTE [DISTWIDTH] IN BLOOD BY AUTOMATED COUNT: 14.1 % (ref 11.5–14.5)
HCT VFR BLD AUTO: 44.6 % (ref 37–47)
HGB BLD-MCNC: 14.1 GM/DL (ref 12–16)
MCH RBC QN AUTO: 33.1 PG (ref 26–33)
MCHC RBC AUTO-ENTMCNC: 31.6 GM/DL (ref 32.2–35.5)
MCV RBC AUTO: 104.7 FL (ref 81–99)
MICROALBUMIN UR-MCNC: 14.95 MG/DL
MICROALBUMIN/CREAT RATIO PNL UR: 633 MG/G (ref 0–30)
PHOSPHATE SERPL-MCNC: 3.6 MG/DL (ref 2.4–4.7)
PLATELET # BLD AUTO: 273 THOU/MM3 (ref 130–400)
PMV BLD AUTO: 9.6 FL (ref 9.4–12.4)
RBC # BLD AUTO: 4.26 MILL/MM3 (ref 4.2–5.4)
WBC # BLD AUTO: 5 THOU/MM3 (ref 4.8–10.8)

## 2024-08-13 ENCOUNTER — TELEPHONE (OUTPATIENT)
Dept: NEPHROLOGY | Age: 69
End: 2024-08-13

## 2024-08-13 DIAGNOSIS — N18.32 STAGE 3B CHRONIC KIDNEY DISEASE (HCC): ICD-10-CM

## 2024-08-13 DIAGNOSIS — N18.32 STAGE 3B CHRONIC KIDNEY DISEASE (HCC): Primary | ICD-10-CM

## 2024-08-13 LAB
25(OH)D3 SERPL-MCNC: 54 NG/ML (ref 30–100)
ANION GAP SERPL CALC-SCNC: 21 MEQ/L (ref 8–16)
BUN SERPL-MCNC: 25 MG/DL (ref 7–22)
CALCIUM SERPL-MCNC: 9.5 MG/DL (ref 8.5–10.5)
CHLORIDE SERPL-SCNC: 105 MEQ/L (ref 98–111)
CO2 SERPL-SCNC: 17 MEQ/L (ref 23–33)
CREAT SERPL-MCNC: 1.4 MG/DL (ref 0.4–1.2)
GFR SERPL CREATININE-BSD FRML MDRD: 41 ML/MIN/1.73M2
GLUCOSE SERPL-MCNC: 74 MG/DL (ref 70–108)
POTASSIUM SERPL-SCNC: 4.6 MEQ/L (ref 3.5–5.2)
PTH-INTACT SERPL-MCNC: 54.1 PG/ML (ref 15–65)
SODIUM SERPL-SCNC: 143 MEQ/L (ref 135–145)

## 2024-08-14 ENCOUNTER — OFFICE VISIT (OUTPATIENT)
Dept: NEPHROLOGY | Age: 69
End: 2024-08-14
Payer: MEDICARE

## 2024-08-14 VITALS
OXYGEN SATURATION: 98 % | SYSTOLIC BLOOD PRESSURE: 118 MMHG | WEIGHT: 144 LBS | DIASTOLIC BLOOD PRESSURE: 60 MMHG | BODY MASS INDEX: 22.21 KG/M2 | HEART RATE: 70 BPM

## 2024-08-14 DIAGNOSIS — N02.B9 IGA NEPHROPATHY: ICD-10-CM

## 2024-08-14 DIAGNOSIS — N18.32 STAGE 3B CHRONIC KIDNEY DISEASE (HCC): Primary | ICD-10-CM

## 2024-08-14 PROCEDURE — 1123F ACP DISCUSS/DSCN MKR DOCD: CPT | Performed by: INTERNAL MEDICINE

## 2024-08-14 PROCEDURE — G2211 COMPLEX E/M VISIT ADD ON: HCPCS | Performed by: INTERNAL MEDICINE

## 2024-08-14 PROCEDURE — 99214 OFFICE O/P EST MOD 30 MIN: CPT | Performed by: INTERNAL MEDICINE

## 2024-08-14 PROCEDURE — 3074F SYST BP LT 130 MM HG: CPT | Performed by: INTERNAL MEDICINE

## 2024-08-14 PROCEDURE — 3078F DIAST BP <80 MM HG: CPT | Performed by: INTERNAL MEDICINE

## 2024-08-14 RX ORDER — LOSARTAN POTASSIUM 50 MG/1
50 TABLET ORAL DAILY
Qty: 90 TABLET | Refills: 3 | Status: SHIPPED | OUTPATIENT
Start: 2024-08-14

## 2024-08-14 RX ORDER — SODIUM BICARBONATE 650 MG/1
1300 TABLET ORAL 2 TIMES DAILY
Qty: 20 TABLET | Refills: 0 | Status: SHIPPED | OUTPATIENT
Start: 2024-08-14 | End: 2024-08-19

## 2024-08-14 NOTE — PROGRESS NOTES
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        She was hospitalized in October 2018 with MSSA bacteremia and L4-L5 discitis.  She developed LOUISA during that admission with a peak creatinine of 2.8 that improved to 1.4 on discharge.  She again was hospitalized a month later in November 2018 readmitted with worsening kidney function and significant proteinuria 17 to 18 g.  She also had persistent blood and microscopic hematuria.  Her serologies that were sent were all negative.  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  She was treated with high-dose steroids followed by p.o. prednisone and she was also treated with Cytoxan 500 mg IV for 4 doses, her last dose was in April 2019.       Additional history includes bladder CA s/p radical cystectomy August 2018 chemotherapy.  she is following with Dr. Sierra, urology.  CT scans are showing no evidence of recurrence.    Doing well.   CT from October showed right hydronephrosis but has atrophic right kidney.   No swelling. Bp controlled.      Pertinent items are noted in HPI.         Past History:  Past Medical History:   Diagnosis Date    Bladder cancer (HCC)     Hypertension     IgA nephropathy     Rapidly progressive glomerulonephritis      Past Surgical History:   Procedure Laterality Date    BLADDER REMOVAL      HYSTERECTOMY, TOTAL ABDOMINAL (CERVIX REMOVED)          VITALS:  /60 (Site: Right

## 2024-08-27 ENCOUNTER — TELEPHONE (OUTPATIENT)
Dept: NEPHROLOGY | Age: 69
End: 2024-08-27

## 2024-08-27 NOTE — TELEPHONE ENCOUNTER
----- Message from Dr. Dawna Allen DO sent at 8/27/2024 12:56 PM EDT -----  Bicarbonate level looks better.   No med changes needed  ----- Message -----  From: Andrew Mancilla CMA ([facility])  Sent: 8/27/2024   7:28 AM EDT  To: Dawna Allen DO

## 2024-08-27 NOTE — TELEPHONE ENCOUNTER
I spoke to Sharon and let her know sodium bicarb looks better, no med changes at this time. She said okay thank you.

## 2024-10-23 ENCOUNTER — HOSPITAL ENCOUNTER (OUTPATIENT)
Dept: ULTRASOUND IMAGING | Age: 69
Discharge: HOME | End: 2024-10-23
Attending: RADIOLOGY

## 2024-10-23 DIAGNOSIS — Z00.6 ENCOUNTER FOR EXAMINATION FOR NORMAL COMPARISON OR CONTROL IN CLINICAL RESEARCH PROGRAM: ICD-10-CM

## 2024-10-28 ENCOUNTER — OFFICE VISIT (OUTPATIENT)
Dept: UROLOGY | Age: 69
End: 2024-10-28
Payer: MEDICARE

## 2024-10-28 VITALS — BODY MASS INDEX: 21.37 KG/M2 | HEIGHT: 68 IN | WEIGHT: 141 LBS | RESPIRATION RATE: 14 BRPM

## 2024-10-28 DIAGNOSIS — N26.1 ATROPHY OF RIGHT KIDNEY: ICD-10-CM

## 2024-10-28 DIAGNOSIS — C67.9 MALIGNANT NEOPLASM OF URINARY BLADDER, UNSPECIFIED SITE (HCC): Primary | ICD-10-CM

## 2024-10-28 PROCEDURE — 1159F MED LIST DOCD IN RCRD: CPT | Performed by: NURSE PRACTITIONER

## 2024-10-28 PROCEDURE — 99213 OFFICE O/P EST LOW 20 MIN: CPT | Performed by: NURSE PRACTITIONER

## 2024-10-28 PROCEDURE — 1123F ACP DISCUSS/DSCN MKR DOCD: CPT | Performed by: NURSE PRACTITIONER

## 2024-10-28 NOTE — PROGRESS NOTES
Assessment/Plan:   1. Malignant neoplasm of urinary bladder, unspecified site (HCC)  - Hx of cystectomy with ileal conduit. > 5 years since surgery and doing great. Had surgery at [facility]  - Will continue with annual surveillance.     2. Atrophy of right kidney  Following with nephrology.       -Patient has no other questions, comments, or concerns.   -They agree with and understand the plan of care.   -The patient was encouraged to call the office or seek emergency care should this change.      Arcelia Gardner, SOPHIE - CNP

## 2025-04-16 ENCOUNTER — TELEPHONE (OUTPATIENT)
Dept: UROLOGY | Age: 70
End: 2025-04-16

## 2025-04-16 NOTE — TELEPHONE ENCOUNTER
Patient scheduled for CT ABDOMEN  at [facility] on 10/21/25.  Arrival of 1000 for a 1030 scan time.  Order mailed with instructions or given to the patient in the office     ORDERS AND INSTRUCTIONS MAILED

## 2025-08-08 LAB
ALBUMIN URINE, EXTERNAL: 12.4
BASOPHILS ABSOLUTE: NORMAL
BASOPHILS RELATIVE PERCENT: NORMAL
BUN BLDV-MCNC: 26 MG/DL
CALCIUM SERPL-MCNC: 9.1 MG/DL
CHLORIDE BLD-SCNC: 101 MMOL/L
CO2: 26 MMOL/L
CREAT SERPL-MCNC: 1.61 MG/DL
CREATININE, URINE, EXTERNAL: 16
EGFR: 35
EOSINOPHILS ABSOLUTE: NORMAL
EOSINOPHILS RELATIVE PERCENT: NORMAL
GLUCOSE BLD-MCNC: 89 MG/DL
HCT VFR BLD CALC: 46 % (ref 36–46)
HEMOGLOBIN: 15.3 G/DL (ref 12–16)
LYMPHOCYTES ABSOLUTE: NORMAL
LYMPHOCYTES RELATIVE PERCENT: NORMAL
MCH RBC QN AUTO: NORMAL PG
MCHC RBC AUTO-ENTMCNC: NORMAL G/DL
MCV RBC AUTO: NORMAL FL
MICROALBUMIN/CREAT UR: 775 MG/G{CREAT}
MONOCYTES ABSOLUTE: NORMAL
MONOCYTES RELATIVE PERCENT: NORMAL
NEUTROPHILS ABSOLUTE: NORMAL
NEUTROPHILS RELATIVE PERCENT: NORMAL
PHOSPHORUS: 3.4 MG/DL
PLATELET # BLD: 277 K/ΜL
PMV BLD AUTO: NORMAL FL
POTASSIUM SERPL-SCNC: 4.4 MMOL/L
PTH INTACT: 64
RBC # BLD: 4.55 10^6/ΜL
SODIUM BLD-SCNC: 136 MMOL/L
VITAMIN D 25-HYDROXY: 56
VITAMIN D2, 25 HYDROXY: NORMAL
VITAMIN D3,25 HYDROXY: NORMAL
WBC # BLD: 5.5 10^3/ML

## 2025-08-13 ENCOUNTER — OFFICE VISIT (OUTPATIENT)
Dept: NEPHROLOGY | Age: 70
End: 2025-08-13
Payer: MEDICARE

## 2025-08-13 VITALS
DIASTOLIC BLOOD PRESSURE: 74 MMHG | SYSTOLIC BLOOD PRESSURE: 120 MMHG | OXYGEN SATURATION: 98 % | BODY MASS INDEX: 19.89 KG/M2 | WEIGHT: 129 LBS | HEART RATE: 70 BPM

## 2025-08-13 DIAGNOSIS — N18.32 STAGE 3B CHRONIC KIDNEY DISEASE (HCC): Primary | ICD-10-CM

## 2025-08-13 DIAGNOSIS — N02.B9 IGA NEPHROPATHY: ICD-10-CM

## 2025-08-13 PROCEDURE — G8427 DOCREV CUR MEDS BY ELIG CLIN: HCPCS | Performed by: INTERNAL MEDICINE

## 2025-08-13 PROCEDURE — G8400 PT W/DXA NO RESULTS DOC: HCPCS | Performed by: INTERNAL MEDICINE

## 2025-08-13 PROCEDURE — 1036F TOBACCO NON-USER: CPT | Performed by: INTERNAL MEDICINE

## 2025-08-13 PROCEDURE — 1159F MED LIST DOCD IN RCRD: CPT | Performed by: INTERNAL MEDICINE

## 2025-08-13 PROCEDURE — G8420 CALC BMI NORM PARAMETERS: HCPCS | Performed by: INTERNAL MEDICINE

## 2025-08-13 PROCEDURE — 3017F COLORECTAL CA SCREEN DOC REV: CPT | Performed by: INTERNAL MEDICINE

## 2025-08-13 PROCEDURE — 1090F PRES/ABSN URINE INCON ASSESS: CPT | Performed by: INTERNAL MEDICINE

## 2025-08-13 PROCEDURE — 99214 OFFICE O/P EST MOD 30 MIN: CPT | Performed by: INTERNAL MEDICINE

## 2025-08-13 PROCEDURE — 1123F ACP DISCUSS/DSCN MKR DOCD: CPT | Performed by: INTERNAL MEDICINE

## 2025-08-13 PROCEDURE — 3074F SYST BP LT 130 MM HG: CPT | Performed by: INTERNAL MEDICINE

## 2025-08-13 PROCEDURE — 3078F DIAST BP <80 MM HG: CPT | Performed by: INTERNAL MEDICINE

## 2025-08-13 RX ORDER — LOSARTAN POTASSIUM 50 MG/1
50 TABLET ORAL DAILY
Qty: 90 TABLET | Refills: 3 | Status: SHIPPED | OUTPATIENT
Start: 2025-08-13